# Patient Record
Sex: MALE | Race: WHITE | NOT HISPANIC OR LATINO | Employment: FULL TIME | URBAN - METROPOLITAN AREA
[De-identification: names, ages, dates, MRNs, and addresses within clinical notes are randomized per-mention and may not be internally consistent; named-entity substitution may affect disease eponyms.]

---

## 2023-11-08 ENCOUNTER — PHARMACY VISIT (OUTPATIENT)
Dept: PHARMACY | Facility: CLINIC | Age: 63
End: 2023-11-08
Payer: MEDICARE

## 2023-11-08 DIAGNOSIS — I48.0 AF (PAROXYSMAL ATRIAL FIBRILLATION) (MULTI): Primary | ICD-10-CM

## 2023-11-08 PROCEDURE — RXMED WILLOW AMBULATORY MEDICATION CHARGE

## 2023-11-08 RX ORDER — APIXABAN 5 MG/1
5 TABLET, FILM COATED ORAL 2 TIMES DAILY
COMMUNITY
End: 2023-11-08 | Stop reason: SDUPTHER

## 2023-11-08 RX ORDER — APIXABAN 5 MG/1
5 TABLET, FILM COATED ORAL 2 TIMES DAILY
Qty: 180 TABLET | Refills: 3 | Status: SHIPPED | OUTPATIENT
Start: 2023-11-08 | End: 2024-06-03

## 2023-11-09 ENCOUNTER — PHARMACY VISIT (OUTPATIENT)
Dept: PHARMACY | Facility: CLINIC | Age: 63
End: 2023-11-09

## 2023-11-09 PROCEDURE — RXOTC WILLOW AMBULATORY OTC CHARGE

## 2023-11-15 ASSESSMENT — DERMATOLOGY QUALITY OF LIFE (QOL) ASSESSMENT
RATE HOW BOTHERED YOU ARE BY EFFECTS OF YOUR SKIN PROBLEMS ON YOUR ACTIVITIES (EG, GOING OUT, ACCOMPLISHING WHAT YOU WANT, WORK ACTIVITIES OR YOUR RELATIONSHIPS WITH OTHERS): 0 - NEVER BOTHERED
RATE HOW EMOTIONALLY BOTHERED YOU ARE BY YOUR SKIN PROBLEM (FOR EXAMPLE, WORRY, EMBARRASSMENT, FRUSTRATION): 0 - NEVER BOTHERED
WHAT SINGLE SKIN CONDITION LISTED BELOW IS THE PATIENT ANSWERING THE QUALITY-OF-LIFE ASSESSMENT QUESTIONS ABOUT: NONE OF THE ABOVE
RATE HOW BOTHERED YOU ARE BY EFFECTS OF YOUR SKIN PROBLEMS ON YOUR ACTIVITIES (EG, GOING OUT, ACCOMPLISHING WHAT YOU WANT, WORK ACTIVITIES OR YOUR RELATIONSHIPS WITH OTHERS): 0 - NEVER BOTHERED
RATE HOW BOTHERED YOU ARE BY SYMPTOMS OF YOUR SKIN PROBLEM (EG, ITCHING, STINGING BURNING, HURTING OR SKIN IRRITATION): 0 - NEVER BOTHERED
DATE THE QUALITY-OF-LIFE ASSESSMENT WAS COMPLETED: 66793
RATE HOW EMOTIONALLY BOTHERED YOU ARE BY YOUR SKIN PROBLEM (FOR EXAMPLE, WORRY, EMBARRASSMENT, FRUSTRATION): 0 - NEVER BOTHERED
WHAT SINGLE SKIN CONDITION LISTED BELOW IS THE PATIENT ANSWERING THE QUALITY-OF-LIFE ASSESSMENT QUESTIONS ABOUT: NONE OF THE ABOVE
RATE HOW BOTHERED YOU ARE BY SYMPTOMS OF YOUR SKIN PROBLEM (EG, ITCHING, STINGING BURNING, HURTING OR SKIN IRRITATION): 0 - NEVER BOTHERED

## 2023-11-15 ASSESSMENT — PATIENT GLOBAL ASSESSMENT (PGA): WHAT IS THE PGA: PATIENT GLOBAL ASSESSMENT:  1 - CLEAR

## 2023-11-21 ENCOUNTER — OFFICE VISIT (OUTPATIENT)
Dept: DERMATOLOGY | Facility: CLINIC | Age: 63
End: 2023-11-21
Payer: OTHER GOVERNMENT

## 2023-11-21 DIAGNOSIS — B35.1 ONYCHOMYCOSIS: ICD-10-CM

## 2023-11-21 DIAGNOSIS — B35.3 TINEA PEDIS OF LEFT FOOT: ICD-10-CM

## 2023-11-21 DIAGNOSIS — L85.9: Primary | ICD-10-CM

## 2023-11-21 DIAGNOSIS — B35.3 TINEA PEDIS OF RIGHT FOOT: ICD-10-CM

## 2023-11-21 PROCEDURE — 99203 OFFICE O/P NEW LOW 30 MIN: CPT | Performed by: NURSE PRACTITIONER

## 2023-11-21 NOTE — PROGRESS NOTES
Subjective     Miguel Cano is a 63 y.o. male who presents for the following: Dermatitis (Cracked heels and toes.).     Review of Systems:  No other skin or systemic complaints other than what is documented elsewhere in the note.    The following portions of the chart were reviewed this encounter and updated as appropriate:          Skin Cancer History  No skin cancer on file.      Specialty Problems    None       Objective   Well appearing patient in no apparent distress; mood and affect are within normal limits.    A focused skin examination was performed. All findings within normal limits unless otherwise noted below.    Assessment/Plan   1. Hyperkeratosis of hindfoot (2)  Left Foot - Posterior, Right Foot - Posterior  Hyperkeratotic plaques L>R with fissures noted to the left heel.     Hyperkeratotis (PPK like) likely 2/2 to chronic tinea infection. Tinea infection on feet practically clear today given patient using lotrimin cream. Encouraged twice daily application of thick ointment (bag balm, vaseline, aquaphor) and cover with 100% cotton socks.     2. Onychomycosis (6)  Left 2nd Proximal Nail fold of Toe, Left 3rd Toe Nail Plate, Left Hallux Toe Nail Plate, Right 2nd Toe Nail Plate, Right 3rd Proximal Nail fold of Toe, Right Hallux Toe Nail Plate  Thickened yellow white nails with subungal debris    -We reviewed the etiology of onychomycosis.  It is due to a superficial dermatophyte infection of the nail plate.  This can cause thickening of the nails, as well as yellow discoloration and the toenail can become more fragile and prone to trauma.  -Treatment options discussed.  In order to penetrate the nail plate, oral medications must be used.  Typically terbinafine, an antifungal agent is the treatment of choice.  We discussed that the duration of therapy is at least 3 months, and can even be extended to 6 months based on clinical response.  We discussed that not all patients respond to therapy with  terbinafine.  In some patients who do respond, there is a high risk of recurrence.    -We discussed side effects of terbinafine in detail with the patient, particularly hepatotoxicity.  We discussed that blood work will need to be monitored during the course of therapy.  If fungal culture is positive will check a baseline CBC and LFTs at that time.  If within normal limits then can start terbinafine daily.  Repeat CBC and LFTs should be performed in 6 weeks to ensure no hepatotoxicity while on the medication. Other possible side effects include dysgeusia (disturbance of taste), QT prolongation (heart rate abnormalities), nausea, vomitting or dizzinessness.     Patient is not wanting to proceed with treatment with topical or oral    3. Tinea pedis of left foot  Left Foot - Posterior  Scaling and maceration between toes and over lateral edges of soles.    Continue with lotrimin cream twice daily as needed    4. Tinea pedis of right foot  Right Foot - Posterior  Mild scaly pink patches to around the toes and heels.     Continue with lotrimin cream twice daily as needed        Follow up as needed

## 2023-12-13 DIAGNOSIS — I10 ESSENTIAL (PRIMARY) HYPERTENSION: ICD-10-CM

## 2023-12-14 RX ORDER — LISINOPRIL 20 MG/1
20 TABLET ORAL 2 TIMES DAILY
Qty: 180 TABLET | Refills: 1 | OUTPATIENT
Start: 2023-12-14 | End: 2024-06-11

## 2023-12-26 ENCOUNTER — OFFICE VISIT (OUTPATIENT)
Dept: CARDIOLOGY | Facility: CLINIC | Age: 63
End: 2023-12-26
Payer: OTHER GOVERNMENT

## 2023-12-26 ENCOUNTER — LAB (OUTPATIENT)
Dept: LAB | Facility: LAB | Age: 63
End: 2023-12-26
Payer: OTHER GOVERNMENT

## 2023-12-26 VITALS — DIASTOLIC BLOOD PRESSURE: 98 MMHG | HEART RATE: 58 BPM | SYSTOLIC BLOOD PRESSURE: 138 MMHG

## 2023-12-26 DIAGNOSIS — R94.31 ABNORMAL EKG: ICD-10-CM

## 2023-12-26 DIAGNOSIS — R94.31 ABNORMAL EKG: Primary | ICD-10-CM

## 2023-12-26 DIAGNOSIS — I48.0 PAROXYSMAL ATRIAL FIBRILLATION (MULTI): ICD-10-CM

## 2023-12-26 LAB
ANION GAP SERPL CALC-SCNC: 10 MMOL/L
BUN SERPL-MCNC: 14 MG/DL (ref 8–25)
CALCIUM SERPL-MCNC: 8.9 MG/DL (ref 8.5–10.4)
CHLORIDE SERPL-SCNC: 101 MMOL/L (ref 97–107)
CO2 SERPL-SCNC: 28 MMOL/L (ref 24–31)
CREAT SERPL-MCNC: 0.9 MG/DL (ref 0.4–1.6)
ERYTHROCYTE [DISTWIDTH] IN BLOOD BY AUTOMATED COUNT: 12.8 % (ref 11.5–14.5)
GFR SERPL CREATININE-BSD FRML MDRD: >90 ML/MIN/1.73M*2
GLUCOSE SERPL-MCNC: 94 MG/DL (ref 65–99)
HCT VFR BLD AUTO: 46.9 % (ref 41–52)
HGB BLD-MCNC: 15.3 G/DL (ref 13.5–17.5)
MCH RBC QN AUTO: 29.1 PG (ref 26–34)
MCHC RBC AUTO-ENTMCNC: 32.6 G/DL (ref 32–36)
MCV RBC AUTO: 89 FL (ref 80–100)
NRBC BLD-RTO: 0 /100 WBCS (ref 0–0)
PLATELET # BLD AUTO: 224 X10*3/UL (ref 150–450)
POTASSIUM SERPL-SCNC: 4.2 MMOL/L (ref 3.4–5.1)
RBC # BLD AUTO: 5.26 X10*6/UL (ref 4.5–5.9)
SODIUM SERPL-SCNC: 139 MMOL/L (ref 133–145)
WBC # BLD AUTO: 7 X10*3/UL (ref 4.4–11.3)

## 2023-12-26 PROCEDURE — 1036F TOBACCO NON-USER: CPT | Performed by: INTERNAL MEDICINE

## 2023-12-26 PROCEDURE — 36415 COLL VENOUS BLD VENIPUNCTURE: CPT

## 2023-12-26 PROCEDURE — 85027 COMPLETE CBC AUTOMATED: CPT

## 2023-12-26 PROCEDURE — 99213 OFFICE O/P EST LOW 20 MIN: CPT | Performed by: INTERNAL MEDICINE

## 2023-12-26 PROCEDURE — 93010 ELECTROCARDIOGRAM REPORT: CPT | Performed by: INTERNAL MEDICINE

## 2023-12-26 PROCEDURE — 80048 BASIC METABOLIC PNL TOTAL CA: CPT

## 2023-12-26 PROCEDURE — 93005 ELECTROCARDIOGRAM TRACING: CPT | Performed by: INTERNAL MEDICINE

## 2023-12-26 RX ORDER — HYDROCHLOROTHIAZIDE 25 MG/1
25 TABLET ORAL DAILY
COMMUNITY
End: 2024-06-06

## 2023-12-26 RX ORDER — LISINOPRIL 5 MG/1
5 TABLET ORAL 2 TIMES DAILY
COMMUNITY
Start: 2021-09-01 | End: 2024-03-04 | Stop reason: SDUPTHER

## 2023-12-26 ASSESSMENT — PAIN SCALES - GENERAL: PAINLEVEL: 0-NO PAIN

## 2023-12-26 ASSESSMENT — ENCOUNTER SYMPTOMS
LOSS OF SENSATION IN FEET: 0
DEPRESSION: 0
OCCASIONAL FEELINGS OF UNSTEADINESS: 0

## 2023-12-26 ASSESSMENT — PATIENT HEALTH QUESTIONNAIRE - PHQ9
2. FEELING DOWN, DEPRESSED OR HOPELESS: NOT AT ALL
SUM OF ALL RESPONSES TO PHQ9 QUESTIONS 1 AND 2: 0
1. LITTLE INTEREST OR PLEASURE IN DOING THINGS: NOT AT ALL

## 2023-12-26 NOTE — PROGRESS NOTES
No chief complaint on file.           The patient is well-known to me from previous encounters.  He is a 63-year-old male with a history of atrial fibrillation post catheter-based therapy about 5 years ago.  He was previously on amiodarone but has done quite well with no need for antiarrhythmics.  He currently only takes antihypertensive medications and anticoagulants.  He still has had difficulty losing weight but tries to be physically active walking regularly.  He has no recurrent atrial fibrillation to his knowledge    Atrial Fibrillation  Past medical history includes atrial fibrillation.        Active Ambulatory Problems     Diagnosis Date Noted    No Active Ambulatory Problems     Resolved Ambulatory Problems     Diagnosis Date Noted    No Resolved Ambulatory Problems     Past Medical History:   Diagnosis Date    Personal history of other diseases of the circulatory system         Review of Systems   All other systems reviewed and are negative.       Objective     There were no vitals filed for this visit.     Vitals and nursing note reviewed.   Constitutional:       Appearance: Healthy appearance. Obese.   HENT:    Mouth/Throat:      Pharynx: Oropharynx is clear.   Pulmonary:      Effort: Pulmonary effort is normal.      Breath sounds: Normal breath sounds.   Cardiovascular:      PMI at left midclavicular line. Normal rate. Regular rhythm. Normal S1. Normal S2.       Murmurs: There is a grade 1/6 holosystolic murmur.      No gallop.  No click. No rub.   Pulses:     Intact distal pulses.   Edema:     Peripheral edema absent.   Abdominal:      General: Bowel sounds are normal.   Musculoskeletal:      Cervical back: Normal range of motion. Skin:     General: Skin is warm and dry.   Neurological:      General: No focal deficit present.      Mental Status: Alert and oriented to person, place and time.            Lab Review:   No visits with results within 2 Month(s) from this visit.   Latest known visit with  results is:   [facility] Encounter on 10/24/2022   Component Date Value    Calcium 10/24/2022 9.0     AST 10/24/2022 20     Alkaline Phosphatase 10/24/2022 99     Total Bilirubin 10/24/2022 0.7     Total Protein 10/24/2022 7.1     Albumin 10/24/2022 4.1     Globulin, Total 10/24/2022 3.0     A/G Ratio 10/24/2022 1.4 (L)     Sodium 10/24/2022 140     Potassium 10/24/2022 4.1     Chloride 10/24/2022 102     Bicarbonate 10/24/2022 22 (L)     Anion Gap 10/24/2022 16     Urea Nitrogen 10/24/2022 16     Creatinine 10/24/2022 1.0     Urea Nitrogen/Creatinine* 10/24/2022 16.0     Glucose 10/24/2022 102 (H)     ALT (SGPT) 10/24/2022 20     ESTIMATED GFR 10/24/2022 85     Differential Type 10/24/2022 AUTO DIFF     Immature Granulocyte %, * 10/24/2022 0.30     Neutrophil 10/24/2022 55.00     Lymphocytes % 10/24/2022 32.70     Monocytes % 10/24/2022 9.70 (H)     Eosinophil 10/24/2022 1.80     Basophils % 10/24/2022 0.50     Immature Granulocytes Ab* 10/24/2022 0.02     Neutrophils Absolute 10/24/2022 3.39     Lymphocytes Absolute 10/24/2022 2.02     Monocytes Absolute 10/24/2022 0.60     Eosinophils Absolute 10/24/2022 0.11     Basophils Absolute 10/24/2022 0.03     WBC 10/24/2022 6.2     RBC 10/24/2022 5.25     Hemoglobin 10/24/2022 15.4     Hematocrit 10/24/2022 47.2     MCV 10/24/2022 89.9     MCH 10/24/2022 29.3     MCHC 10/24/2022 32.6     RDW-SD 10/24/2022 41.8     RDW-CV 10/24/2022 12.7     Platelets 10/24/2022 222     MPV 10/24/2022 10.8     nRBC 10/24/2022 0     ABSOLUTE NEUTROPHIL CALC* 10/24/2022 3.39     Hemoglobin A1C 10/24/2022 5.7     SERUM COLOR 10/24/2022 YELLOW     SERUM CLARITY 10/24/2022 CLEAR     Is the patient fasting? 10/24/2022 FASTING     Cholesterol 10/24/2022 143     Triglycerides 10/24/2022 80     HDL 10/24/2022 43     LDL Calculated 10/24/2022 84     Cholesterol/HDL Ratio 10/24/2022 3.3     PSA 10/24/2022 0.8     Thyroid Stimulating Horm* 10/24/2022 3.32        ECG:    Sinus bradycardia 54 bpm OK  interval 174 ms QRS duration 106 ms QTc 445 ms    Assessment/plan:  Continue current regimen.  Problem List Items Addressed This Visit    None

## 2024-01-31 PROCEDURE — RXMED WILLOW AMBULATORY MEDICATION CHARGE

## 2024-02-02 ENCOUNTER — PHARMACY VISIT (OUTPATIENT)
Dept: PHARMACY | Facility: CLINIC | Age: 64
End: 2024-02-02
Payer: COMMERCIAL

## 2024-02-22 RX ORDER — LISINOPRIL 20 MG/1
20 TABLET ORAL 2 TIMES DAILY
Qty: 60 TABLET | Refills: 5 | OUTPATIENT
Start: 2024-02-22 | End: 2024-08-20

## 2024-02-28 ASSESSMENT — ENCOUNTER SYMPTOMS
HYPERTENSION: 1
SHORTNESS OF BREATH: 0
SWEATS: 0
ORTHOPNEA: 0
HEADACHES: 0
NECK PAIN: 0
BLURRED VISION: 0
PALPITATIONS: 0
PND: 0

## 2024-03-04 ENCOUNTER — OFFICE VISIT (OUTPATIENT)
Dept: PRIMARY CARE | Facility: CLINIC | Age: 64
End: 2024-03-04
Payer: OTHER GOVERNMENT

## 2024-03-04 VITALS
OXYGEN SATURATION: 97 % | RESPIRATION RATE: 20 BRPM | BODY MASS INDEX: 38.57 KG/M2 | TEMPERATURE: 97.8 F | HEART RATE: 63 BPM | WEIGHT: 291 LBS | HEIGHT: 73 IN

## 2024-03-04 DIAGNOSIS — Z12.5 PROSTATE CANCER SCREENING: ICD-10-CM

## 2024-03-04 DIAGNOSIS — R53.83 OTHER FATIGUE: ICD-10-CM

## 2024-03-04 DIAGNOSIS — R73.9 HYPERGLYCEMIA: ICD-10-CM

## 2024-03-04 DIAGNOSIS — Z00.00 ANNUAL PHYSICAL EXAM: Primary | ICD-10-CM

## 2024-03-04 DIAGNOSIS — I10 BENIGN ESSENTIAL HYPERTENSION: ICD-10-CM

## 2024-03-04 DIAGNOSIS — E78.00 PURE HYPERCHOLESTEROLEMIA: ICD-10-CM

## 2024-03-04 PROBLEM — I47.10 SVT (SUPRAVENTRICULAR TACHYCARDIA) (CMS-HCC): Status: ACTIVE | Noted: 2024-03-04

## 2024-03-04 PROBLEM — I11.9 HYPERTENSIVE HEART DISEASE WITHOUT HEART FAILURE: Status: ACTIVE | Noted: 2024-03-04

## 2024-03-04 PROBLEM — I25.10 ATHEROSCLEROTIC HEART DISEASE OF NATIVE CORONARY ARTERY WITHOUT ANGINA PECTORIS: Status: ACTIVE | Noted: 2024-03-04

## 2024-03-04 PROBLEM — J34.3 HYPERTROPHY OF BOTH INFERIOR NASAL TURBINATES: Status: ACTIVE | Noted: 2024-03-04

## 2024-03-04 PROBLEM — I45.9 CONDUCTION DISORDER OF THE HEART: Status: ACTIVE | Noted: 2024-03-04

## 2024-03-04 PROBLEM — R00.2 PALPITATIONS: Status: ACTIVE | Noted: 2024-03-04

## 2024-03-04 PROBLEM — M25.662 STIFFNESS OF LEFT KNEE: Status: RESOLVED | Noted: 2022-03-01 | Resolved: 2024-03-04

## 2024-03-04 PROBLEM — M17.12 PRIMARY OSTEOARTHRITIS OF LEFT KNEE: Status: ACTIVE | Noted: 2022-02-06

## 2024-03-04 PROBLEM — G47.33 OBSTRUCTIVE SLEEP APNEA SYNDROME: Status: ACTIVE | Noted: 2024-03-04

## 2024-03-04 PROBLEM — I42.0 DILATED CARDIOMYOPATHY (MULTI): Status: ACTIVE | Noted: 2024-03-04

## 2024-03-04 PROBLEM — Z96.659 HISTORY OF TOTAL KNEE REPLACEMENT: Status: ACTIVE | Noted: 2018-06-18

## 2024-03-04 PROBLEM — R06.02 SHORTNESS OF BREATH: Status: RESOLVED | Noted: 2024-03-04 | Resolved: 2024-03-04

## 2024-03-04 LAB
ALBUMIN SERPL-MCNC: 4.2 G/DL (ref 3.5–5)
ALP BLD-CCNC: 96 U/L (ref 35–125)
ALT SERPL-CCNC: 20 U/L (ref 5–40)
ANION GAP SERPL CALC-SCNC: 14 MMOL/L
AST SERPL-CCNC: 20 U/L (ref 5–40)
BASOPHILS # BLD AUTO: 0.03 X10*3/UL (ref 0–0.1)
BASOPHILS NFR BLD AUTO: 0.5 %
BILIRUB SERPL-MCNC: 0.9 MG/DL (ref 0.1–1.2)
BUN SERPL-MCNC: 13 MG/DL (ref 8–25)
CALCIUM SERPL-MCNC: 9.2 MG/DL (ref 8.5–10.4)
CHLORIDE SERPL-SCNC: 99 MMOL/L (ref 97–107)
CHOLEST SERPL-MCNC: 133 MG/DL (ref 133–200)
CHOLEST/HDLC SERPL: 2.9 {RATIO}
CO2 SERPL-SCNC: 23 MMOL/L (ref 24–31)
CREAT SERPL-MCNC: 0.8 MG/DL (ref 0.4–1.6)
EGFRCR SERPLBLD CKD-EPI 2021: >90 ML/MIN/1.73M*2
EOSINOPHIL # BLD AUTO: 0.08 X10*3/UL (ref 0–0.7)
EOSINOPHIL NFR BLD AUTO: 1.3 %
ERYTHROCYTE [DISTWIDTH] IN BLOOD BY AUTOMATED COUNT: 12.8 % (ref 11.5–14.5)
GLUCOSE SERPL-MCNC: 107 MG/DL (ref 65–99)
HCT VFR BLD AUTO: 46.8 % (ref 41–52)
HDLC SERPL-MCNC: 46 MG/DL
HGB BLD-MCNC: 15.6 G/DL (ref 13.5–17.5)
IMM GRANULOCYTES # BLD AUTO: 0.02 X10*3/UL (ref 0–0.7)
IMM GRANULOCYTES NFR BLD AUTO: 0.3 % (ref 0–0.9)
LDLC SERPL CALC-MCNC: 69 MG/DL (ref 65–130)
LYMPHOCYTES # BLD AUTO: 2.02 X10*3/UL (ref 1.2–4.8)
LYMPHOCYTES NFR BLD AUTO: 33.4 %
MCH RBC QN AUTO: 29.3 PG (ref 26–34)
MCHC RBC AUTO-ENTMCNC: 33.3 G/DL (ref 32–36)
MCV RBC AUTO: 88 FL (ref 80–100)
MONOCYTES # BLD AUTO: 0.47 X10*3/UL (ref 0.1–1)
MONOCYTES NFR BLD AUTO: 7.8 %
NEUTROPHILS # BLD AUTO: 3.43 X10*3/UL (ref 1.2–7.7)
NEUTROPHILS NFR BLD AUTO: 56.7 %
NRBC BLD-RTO: 0 /100 WBCS (ref 0–0)
PLATELET # BLD AUTO: 232 X10*3/UL (ref 150–450)
POTASSIUM SERPL-SCNC: 4.1 MMOL/L (ref 3.4–5.1)
PROT SERPL-MCNC: 6.8 G/DL (ref 5.9–7.9)
PSA SERPL-MCNC: 1.1 NG/ML
RBC # BLD AUTO: 5.32 X10*6/UL (ref 4.5–5.9)
SODIUM SERPL-SCNC: 136 MMOL/L (ref 133–145)
TRIGL SERPL-MCNC: 89 MG/DL (ref 40–150)
WBC # BLD AUTO: 6.1 X10*3/UL (ref 4.4–11.3)

## 2024-03-04 PROCEDURE — 99396 PREV VISIT EST AGE 40-64: CPT | Performed by: FAMILY MEDICINE

## 2024-03-04 PROCEDURE — 85025 COMPLETE CBC W/AUTO DIFF WBC: CPT | Performed by: FAMILY MEDICINE

## 2024-03-04 PROCEDURE — 80053 COMPREHEN METABOLIC PANEL: CPT | Performed by: FAMILY MEDICINE

## 2024-03-04 PROCEDURE — 1036F TOBACCO NON-USER: CPT | Performed by: FAMILY MEDICINE

## 2024-03-04 PROCEDURE — 80061 LIPID PANEL: CPT | Performed by: FAMILY MEDICINE

## 2024-03-04 PROCEDURE — 36415 COLL VENOUS BLD VENIPUNCTURE: CPT | Performed by: FAMILY MEDICINE

## 2024-03-04 PROCEDURE — 83036 HEMOGLOBIN GLYCOSYLATED A1C: CPT | Performed by: FAMILY MEDICINE

## 2024-03-04 PROCEDURE — 84153 ASSAY OF PSA TOTAL: CPT | Performed by: FAMILY MEDICINE

## 2024-03-04 RX ORDER — LISINOPRIL 5 MG/1
5 TABLET ORAL 2 TIMES DAILY
Qty: 90 TABLET | Refills: 3 | Status: SHIPPED | OUTPATIENT
Start: 2024-03-04 | End: 2024-06-03

## 2024-03-04 ASSESSMENT — ENCOUNTER SYMPTOMS
NECK PAIN: 0
HEADACHES: 0
SWEATS: 0
PALPITATIONS: 0
ORTHOPNEA: 0
BLURRED VISION: 0
SHORTNESS OF BREATH: 0
PND: 0
HYPERTENSION: 1

## 2024-03-04 ASSESSMENT — PATIENT HEALTH QUESTIONNAIRE - PHQ9
1. LITTLE INTEREST OR PLEASURE IN DOING THINGS: NOT AT ALL
SUM OF ALL RESPONSES TO PHQ9 QUESTIONS 1 AND 2: 0
2. FEELING DOWN, DEPRESSED OR HOPELESS: NOT AT ALL

## 2024-03-04 ASSESSMENT — PAIN SCALES - GENERAL: PAINLEVEL: 0-NO PAIN

## 2024-03-04 NOTE — PROGRESS NOTES
"Subjective   Patient ID: Miguel Cano is a 63 y.o. male who presents for medication refills (Pt here for medication refills).    Hypertension  This is a chronic problem. The current episode started more than 1 year ago. The problem is unchanged. The problem is controlled. Pertinent negatives include no anxiety, blurred vision, chest pain, headaches, malaise/fatigue, neck pain, orthopnea, palpitations, peripheral edema, PND, shortness of breath or sweats. There are no associated agents to hypertension. Risk factors for coronary artery disease include obesity. Compliance problems include diet.         Review of Systems   Constitutional:  Negative for malaise/fatigue.   Eyes:  Negative for blurred vision.   Respiratory:  Negative for shortness of breath.    Cardiovascular:  Negative for chest pain, palpitations, orthopnea and PND.   Musculoskeletal:  Negative for neck pain.   Neurological:  Negative for headaches.       Objective   Pulse 63   Temp 36.6 °C (97.8 °F) (Temporal)   Resp 20   Ht 1.854 m (6' 1\")   Wt 132 kg (291 lb)   SpO2 97%   BMI 38.39 kg/m²     Physical Exam  Vitals and nursing note reviewed.   Constitutional:       General: He is not in acute distress.  HENT:      Right Ear: Tympanic membrane and ear canal normal.      Left Ear: Tympanic membrane and ear canal normal.      Nose: Nose normal. No rhinorrhea.      Mouth/Throat:      Pharynx: Oropharynx is clear. No oropharyngeal exudate or posterior oropharyngeal erythema.      Comments: Dentition wnl  Eyes:      Extraocular Movements: Extraocular movements intact.      Conjunctiva/sclera: Conjunctivae normal.      Pupils: Pupils are equal, round, and reactive to light.   Neck:      Vascular: No carotid bruit.   Cardiovascular:      Rate and Rhythm: Normal rate and regular rhythm.      Heart sounds: Normal heart sounds. No murmur heard.  Pulmonary:      Breath sounds: Normal breath sounds. No wheezing or rhonchi.   Abdominal:      General: " Bowel sounds are normal. There is no distension.      Palpations: Abdomen is soft. There is no mass.      Tenderness: There is no abdominal tenderness. There is no guarding or rebound.      Hernia: No hernia is present.   Musculoskeletal:         General: No swelling or tenderness. Normal range of motion.      Cervical back: Normal range of motion and neck supple.   Lymphadenopathy:      Cervical: No cervical adenopathy.   Skin:     General: Skin is warm.      Findings: No rash.   Neurological:      General: No focal deficit present.      Mental Status: He is alert.         Assessment/Plan   Problem List Items Addressed This Visit             ICD-10-CM    Annual physical exam - Primary Z00.00

## 2024-03-05 DIAGNOSIS — R73.9 HYPERGLYCEMIA: ICD-10-CM

## 2024-03-05 LAB
EST. AVERAGE GLUCOSE BLD GHB EST-MCNC: 123 MG/DL
HBA1C MFR BLD: 5.9 %

## 2024-03-06 DIAGNOSIS — I10 BENIGN ESSENTIAL HYPERTENSION: ICD-10-CM

## 2024-03-06 RX ORDER — LISINOPRIL 20 MG/1
20 TABLET ORAL DAILY
COMMUNITY
End: 2024-04-04 | Stop reason: ENTERED-IN-ERROR

## 2024-03-07 RX ORDER — LISINOPRIL 20 MG/1
20 TABLET ORAL 2 TIMES DAILY
Qty: 180 TABLET | Refills: 1 | OUTPATIENT
Start: 2024-03-07

## 2024-03-08 RX ORDER — LISINOPRIL 20 MG/1
20 TABLET ORAL 2 TIMES DAILY
Qty: 180 TABLET | Refills: 3 | OUTPATIENT
Start: 2024-03-08 | End: 2025-03-03

## 2024-04-04 ENCOUNTER — HOSPITAL ENCOUNTER (OUTPATIENT)
Facility: HOSPITAL | Age: 64
Setting detail: OBSERVATION
Discharge: HOME | End: 2024-04-05
Attending: EMERGENCY MEDICINE | Admitting: INTERNAL MEDICINE
Payer: OTHER GOVERNMENT

## 2024-04-04 ENCOUNTER — APPOINTMENT (OUTPATIENT)
Dept: CARDIOLOGY | Facility: HOSPITAL | Age: 64
End: 2024-04-04
Payer: OTHER GOVERNMENT

## 2024-04-04 DIAGNOSIS — R79.89 ELEVATED TROPONIN: ICD-10-CM

## 2024-04-04 DIAGNOSIS — I48.3 TYPICAL ATRIAL FLUTTER (MULTI): Primary | ICD-10-CM

## 2024-04-04 LAB
ALBUMIN SERPL-MCNC: 4 G/DL (ref 3.5–5)
ALP BLD-CCNC: 98 U/L (ref 35–125)
ALT SERPL-CCNC: 23 U/L (ref 5–40)
ANION GAP SERPL CALC-SCNC: 12 MMOL/L
AST SERPL-CCNC: 23 U/L (ref 5–40)
BASOPHILS # BLD AUTO: 0.03 X10*3/UL (ref 0–0.1)
BASOPHILS NFR BLD AUTO: 0.4 %
BILIRUB SERPL-MCNC: 1 MG/DL (ref 0.1–1.2)
BUN SERPL-MCNC: 14 MG/DL (ref 8–25)
CALCIUM SERPL-MCNC: 8.7 MG/DL (ref 8.5–10.4)
CHLORIDE SERPL-SCNC: 99 MMOL/L (ref 97–107)
CO2 SERPL-SCNC: 25 MMOL/L (ref 24–31)
CREAT SERPL-MCNC: 0.9 MG/DL (ref 0.4–1.6)
EGFRCR SERPLBLD CKD-EPI 2021: >90 ML/MIN/1.73M*2
EOSINOPHIL # BLD AUTO: 0.11 X10*3/UL (ref 0–0.7)
EOSINOPHIL NFR BLD AUTO: 1.5 %
ERYTHROCYTE [DISTWIDTH] IN BLOOD BY AUTOMATED COUNT: 13.3 % (ref 11.5–14.5)
GLUCOSE SERPL-MCNC: 109 MG/DL (ref 65–99)
HCT VFR BLD AUTO: 48.5 % (ref 41–52)
HGB BLD-MCNC: 15.9 G/DL (ref 13.5–17.5)
IMM GRANULOCYTES # BLD AUTO: 0.03 X10*3/UL (ref 0–0.7)
IMM GRANULOCYTES NFR BLD AUTO: 0.4 % (ref 0–0.9)
LYMPHOCYTES # BLD AUTO: 2.43 X10*3/UL (ref 1.2–4.8)
LYMPHOCYTES NFR BLD AUTO: 32.9 %
MAGNESIUM SERPL-MCNC: 1.9 MG/DL (ref 1.6–3.1)
MCH RBC QN AUTO: 29.1 PG (ref 26–34)
MCHC RBC AUTO-ENTMCNC: 32.8 G/DL (ref 32–36)
MCV RBC AUTO: 89 FL (ref 80–100)
MONOCYTES # BLD AUTO: 0.63 X10*3/UL (ref 0.1–1)
MONOCYTES NFR BLD AUTO: 8.5 %
NEUTROPHILS # BLD AUTO: 4.15 X10*3/UL (ref 1.2–7.7)
NEUTROPHILS NFR BLD AUTO: 56.3 %
NRBC BLD-RTO: 0 /100 WBCS (ref 0–0)
NT-PROBNP SERPL-MCNC: 1557 PG/ML (ref 0–177)
PLATELET # BLD AUTO: 245 X10*3/UL (ref 150–450)
POTASSIUM SERPL-SCNC: 3.8 MMOL/L (ref 3.4–5.1)
PROT SERPL-MCNC: 6.9 G/DL (ref 5.9–7.9)
RBC # BLD AUTO: 5.47 X10*6/UL (ref 4.5–5.9)
SODIUM SERPL-SCNC: 136 MMOL/L (ref 133–145)
TROPONIN T SERPL-MCNC: 75 NG/L
TROPONIN T SERPL-MCNC: 75 NG/L
TROPONIN T SERPL-MCNC: 86 NG/L
TSH SERPL DL<=0.05 MIU/L-ACNC: 1.42 MIU/L (ref 0.27–4.2)
WBC # BLD AUTO: 7.4 X10*3/UL (ref 4.4–11.3)

## 2024-04-04 PROCEDURE — 84484 ASSAY OF TROPONIN QUANT: CPT | Performed by: EMERGENCY MEDICINE

## 2024-04-04 PROCEDURE — 83735 ASSAY OF MAGNESIUM: CPT | Performed by: EMERGENCY MEDICINE

## 2024-04-04 PROCEDURE — 84443 ASSAY THYROID STIM HORMONE: CPT | Performed by: REGISTERED NURSE

## 2024-04-04 PROCEDURE — G0378 HOSPITAL OBSERVATION PER HR: HCPCS

## 2024-04-04 PROCEDURE — 93005 ELECTROCARDIOGRAM TRACING: CPT

## 2024-04-04 PROCEDURE — 96374 THER/PROPH/DIAG INJ IV PUSH: CPT

## 2024-04-04 PROCEDURE — 2500000001 HC RX 250 WO HCPCS SELF ADMINISTERED DRUGS (ALT 637 FOR MEDICARE OP): Performed by: NURSE PRACTITIONER

## 2024-04-04 PROCEDURE — 83880 ASSAY OF NATRIURETIC PEPTIDE: CPT | Performed by: REGISTERED NURSE

## 2024-04-04 PROCEDURE — 80053 COMPREHEN METABOLIC PANEL: CPT | Performed by: EMERGENCY MEDICINE

## 2024-04-04 PROCEDURE — 36415 COLL VENOUS BLD VENIPUNCTURE: CPT | Performed by: EMERGENCY MEDICINE

## 2024-04-04 PROCEDURE — 96376 TX/PRO/DX INJ SAME DRUG ADON: CPT

## 2024-04-04 PROCEDURE — 99223 1ST HOSP IP/OBS HIGH 75: CPT | Performed by: NURSE PRACTITIONER

## 2024-04-04 PROCEDURE — 2500000005 HC RX 250 GENERAL PHARMACY W/O HCPCS: Performed by: EMERGENCY MEDICINE

## 2024-04-04 PROCEDURE — 85025 COMPLETE CBC W/AUTO DIFF WBC: CPT | Performed by: EMERGENCY MEDICINE

## 2024-04-04 PROCEDURE — 2500000001 HC RX 250 WO HCPCS SELF ADMINISTERED DRUGS (ALT 637 FOR MEDICARE OP): Performed by: REGISTERED NURSE

## 2024-04-04 PROCEDURE — 99285 EMERGENCY DEPT VISIT HI MDM: CPT | Mod: 25

## 2024-04-04 RX ORDER — ACETAMINOPHEN 325 MG/1
650 TABLET ORAL EVERY 4 HOURS PRN
Status: DISCONTINUED | OUTPATIENT
Start: 2024-04-04 | End: 2024-04-05 | Stop reason: HOSPADM

## 2024-04-04 RX ORDER — LORAZEPAM 2 MG/ML
INJECTION INTRAMUSCULAR
Status: DISPENSED
Start: 2024-04-04 | End: 2024-04-04

## 2024-04-04 RX ORDER — ACETAMINOPHEN 160 MG/5ML
650 SOLUTION ORAL EVERY 4 HOURS PRN
Status: DISCONTINUED | OUTPATIENT
Start: 2024-04-04 | End: 2024-04-05 | Stop reason: HOSPADM

## 2024-04-04 RX ORDER — POLYETHYLENE GLYCOL 3350 17 G/17G
17 POWDER, FOR SOLUTION ORAL DAILY
Status: DISCONTINUED | OUTPATIENT
Start: 2024-04-04 | End: 2024-04-05 | Stop reason: HOSPADM

## 2024-04-04 RX ORDER — LISINOPRIL 5 MG/1
5 TABLET ORAL 2 TIMES DAILY
Status: DISCONTINUED | OUTPATIENT
Start: 2024-04-04 | End: 2024-04-05 | Stop reason: HOSPADM

## 2024-04-04 RX ORDER — METOPROLOL TARTRATE 1 MG/ML
5 INJECTION, SOLUTION INTRAVENOUS EVERY 6 HOURS PRN
Status: DISCONTINUED | OUTPATIENT
Start: 2024-04-04 | End: 2024-04-05 | Stop reason: HOSPADM

## 2024-04-04 RX ORDER — ONDANSETRON HYDROCHLORIDE 2 MG/ML
4 INJECTION, SOLUTION INTRAVENOUS EVERY 8 HOURS PRN
Status: DISCONTINUED | OUTPATIENT
Start: 2024-04-04 | End: 2024-04-05 | Stop reason: HOSPADM

## 2024-04-04 RX ORDER — METOPROLOL TARTRATE 1 MG/ML
5 INJECTION, SOLUTION INTRAVENOUS EVERY 6 HOURS
Status: DISCONTINUED | OUTPATIENT
Start: 2024-04-04 | End: 2024-04-04

## 2024-04-04 RX ORDER — ACETAMINOPHEN 650 MG/1
650 SUPPOSITORY RECTAL EVERY 4 HOURS PRN
Status: DISCONTINUED | OUTPATIENT
Start: 2024-04-04 | End: 2024-04-05 | Stop reason: HOSPADM

## 2024-04-04 RX ORDER — METOPROLOL SUCCINATE 25 MG/1
25 TABLET, EXTENDED RELEASE ORAL 2 TIMES DAILY
Status: DISCONTINUED | OUTPATIENT
Start: 2024-04-04 | End: 2024-04-05 | Stop reason: HOSPADM

## 2024-04-04 RX ORDER — ONDANSETRON 4 MG/1
4 TABLET, FILM COATED ORAL EVERY 8 HOURS PRN
Status: DISCONTINUED | OUTPATIENT
Start: 2024-04-04 | End: 2024-04-05 | Stop reason: HOSPADM

## 2024-04-04 RX ORDER — PANTOPRAZOLE SODIUM 40 MG/1
40 TABLET, DELAYED RELEASE ORAL
Status: DISCONTINUED | OUTPATIENT
Start: 2024-04-05 | End: 2024-04-05 | Stop reason: HOSPADM

## 2024-04-04 RX ORDER — HYDROCHLOROTHIAZIDE 25 MG/1
25 TABLET ORAL DAILY
Status: DISCONTINUED | OUTPATIENT
Start: 2024-04-05 | End: 2024-04-05 | Stop reason: HOSPADM

## 2024-04-04 RX ORDER — METOPROLOL TARTRATE 1 MG/ML
5 INJECTION, SOLUTION INTRAVENOUS EVERY 5 MIN PRN
Status: DISCONTINUED | OUTPATIENT
Start: 2024-04-04 | End: 2024-04-04

## 2024-04-04 RX ADMIN — METOPROLOL TARTRATE 5 MG: 5 INJECTION INTRAVENOUS at 09:23

## 2024-04-04 RX ADMIN — APIXABAN 5 MG: 5 TABLET, FILM COATED ORAL at 20:05

## 2024-04-04 RX ADMIN — METOPROLOL TARTRATE 5 MG: 5 INJECTION INTRAVENOUS at 12:09

## 2024-04-04 RX ADMIN — LISINOPRIL 5 MG: 5 TABLET ORAL at 20:05

## 2024-04-04 RX ADMIN — METOPROLOL SUCCINATE 25 MG: 25 TABLET, FILM COATED, EXTENDED RELEASE ORAL at 20:05

## 2024-04-04 SDOH — SOCIAL STABILITY: SOCIAL INSECURITY: WERE YOU ABLE TO COMPLETE ALL THE BEHAVIORAL HEALTH SCREENINGS?: YES

## 2024-04-04 SDOH — SOCIAL STABILITY: SOCIAL INSECURITY: DO YOU FEEL UNSAFE GOING BACK TO THE PLACE WHERE YOU ARE LIVING?: NO

## 2024-04-04 SDOH — SOCIAL STABILITY: SOCIAL INSECURITY: DO YOU FEEL ANYONE HAS EXPLOITED OR TAKEN ADVANTAGE OF YOU FINANCIALLY OR OF YOUR PERSONAL PROPERTY?: NO

## 2024-04-04 SDOH — SOCIAL STABILITY: SOCIAL INSECURITY: ABUSE: ADULT

## 2024-04-04 SDOH — SOCIAL STABILITY: SOCIAL INSECURITY: ARE YOU OR HAVE YOU BEEN THREATENED OR ABUSED PHYSICALLY, EMOTIONALLY, OR SEXUALLY BY ANYONE?: NO

## 2024-04-04 SDOH — SOCIAL STABILITY: SOCIAL INSECURITY: HAS ANYONE EVER THREATENED TO HURT YOUR FAMILY OR YOUR PETS?: NO

## 2024-04-04 SDOH — SOCIAL STABILITY: SOCIAL INSECURITY: ARE THERE ANY APPARENT SIGNS OF INJURIES/BEHAVIORS THAT COULD BE RELATED TO ABUSE/NEGLECT?: NO

## 2024-04-04 SDOH — SOCIAL STABILITY: SOCIAL INSECURITY: HAVE YOU HAD THOUGHTS OF HARMING ANYONE ELSE?: NO

## 2024-04-04 SDOH — SOCIAL STABILITY: SOCIAL INSECURITY: DOES ANYONE TRY TO KEEP YOU FROM HAVING/CONTACTING OTHER FRIENDS OR DOING THINGS OUTSIDE YOUR HOME?: NO

## 2024-04-04 ASSESSMENT — COGNITIVE AND FUNCTIONAL STATUS - GENERAL
MOBILITY SCORE: 24
DAILY ACTIVITIY SCORE: 24
PATIENT BASELINE BEDBOUND: NO

## 2024-04-04 ASSESSMENT — ACTIVITIES OF DAILY LIVING (ADL)
BATHING: INDEPENDENT
GROOMING: INDEPENDENT
TOILETING: INDEPENDENT
ADEQUATE_TO_COMPLETE_ADL: YES
HEARING - RIGHT EAR: FUNCTIONAL
HEARING - LEFT EAR: FUNCTIONAL
PATIENT'S MEMORY ADEQUATE TO SAFELY COMPLETE DAILY ACTIVITIES?: YES
WALKS IN HOME: INDEPENDENT
JUDGMENT_ADEQUATE_SAFELY_COMPLETE_DAILY_ACTIVITIES: YES
LACK_OF_TRANSPORTATION: NO
FEEDING YOURSELF: INDEPENDENT
DRESSING YOURSELF: INDEPENDENT

## 2024-04-04 ASSESSMENT — ENCOUNTER SYMPTOMS
EYES NEGATIVE: 1
CARDIOVASCULAR NEGATIVE: 1
CHOKING: 0
HEADACHES: 1
PSYCHIATRIC NEGATIVE: 1
APNEA: 0
MUSCULOSKELETAL NEGATIVE: 1
CHEST TIGHTNESS: 0
STRIDOR: 0
WHEEZING: 0
ALLERGIC/IMMUNOLOGIC NEGATIVE: 1
HEMATOLOGIC/LYMPHATIC NEGATIVE: 1
COUGH: 0
ENDOCRINE NEGATIVE: 1
SHORTNESS OF BREATH: 1
GASTROINTESTINAL NEGATIVE: 1
CONSTITUTIONAL NEGATIVE: 1
LIGHT-HEADEDNESS: 1

## 2024-04-04 ASSESSMENT — LIFESTYLE VARIABLES
AUDIT-C TOTAL SCORE: 5
SKIP TO QUESTIONS 9-10: 0
HOW OFTEN DURING THE LAST YEAR HAVE YOU FOUND THAT YOU WERE NOT ABLE TO STOP DRINKING ONCE YOU HAD STARTED: NEVER
HOW OFTEN DURING THE LAST YEAR HAVE YOU FAILED TO DO WHAT WAS NORMALLY EXPECTED FROM YOU BECAUSE OF DRINKING: NEVER
EVER FELT BAD OR GUILTY ABOUT YOUR DRINKING: NO
AUDIT TOTAL SCORE: 0
HOW OFTEN DURING THE LAST YEAR HAVE YOU BEEN UNABLE TO REMEMBER WHAT HAPPENED THE NIGHT BEFORE BECAUSE YOU HAD BEEN DRINKING: NEVER
HOW OFTEN DO YOU HAVE 6 OR MORE DRINKS ON ONE OCCASION: LESS THAN MONTHLY
AUDIT TOTAL SCORE: 5
TOTAL SCORE: 0
HAVE PEOPLE ANNOYED YOU BY CRITICIZING YOUR DRINKING: NO
HOW OFTEN DO YOU HAVE A DRINK CONTAINING ALCOHOL: 2-4 TIMES A MONTH
HOW OFTEN DURING THE LAST YEAR HAVE YOU HAD A FEELING OF GUILT OR REMORSE AFTER DRINKING: NEVER
EVER HAD A DRINK FIRST THING IN THE MORNING TO STEADY YOUR NERVES TO GET RID OF A HANGOVER: NO
HOW OFTEN DURING THE LAST YEAR HAVE YOU NEEDED AN ALCOHOLIC DRINK FIRST THING IN THE MORNING TO GET YOURSELF GOING AFTER A NIGHT OF HEAVY DRINKING: NEVER
HAVE YOU OR SOMEONE ELSE BEEN INJURED AS A RESULT OF YOUR DRINKING: NO
HOW MANY STANDARD DRINKS CONTAINING ALCOHOL DO YOU HAVE ON A TYPICAL DAY: 5 OR 6
HAVE YOU EVER FELT YOU SHOULD CUT DOWN ON YOUR DRINKING: NO
HAS A RELATIVE, FRIEND, DOCTOR, OR ANOTHER HEALTH PROFESSIONAL EXPRESSED CONCERN ABOUT YOUR DRINKING OR SUGGESTED YOU CUT DOWN: NO
AUDIT-C TOTAL SCORE: 5

## 2024-04-04 ASSESSMENT — COLUMBIA-SUICIDE SEVERITY RATING SCALE - C-SSRS
1. IN THE PAST MONTH, HAVE YOU WISHED YOU WERE DEAD OR WISHED YOU COULD GO TO SLEEP AND NOT WAKE UP?: NO
2. HAVE YOU ACTUALLY HAD ANY THOUGHTS OF KILLING YOURSELF?: NO
6. HAVE YOU EVER DONE ANYTHING, STARTED TO DO ANYTHING, OR PREPARED TO DO ANYTHING TO END YOUR LIFE?: NO

## 2024-04-04 ASSESSMENT — PAIN SCALES - GENERAL
PAINLEVEL_OUTOF10: 0 - NO PAIN

## 2024-04-04 ASSESSMENT — PATIENT HEALTH QUESTIONNAIRE - PHQ9
SUM OF ALL RESPONSES TO PHQ9 QUESTIONS 1 & 2: 0
1. LITTLE INTEREST OR PLEASURE IN DOING THINGS: NOT AT ALL
2. FEELING DOWN, DEPRESSED OR HOPELESS: NOT AT ALL

## 2024-04-04 ASSESSMENT — PAIN - FUNCTIONAL ASSESSMENT
PAIN_FUNCTIONAL_ASSESSMENT: 0-10
PAIN_FUNCTIONAL_ASSESSMENT: 0-10

## 2024-04-04 ASSESSMENT — PAIN DESCRIPTION - PROGRESSION: CLINICAL_PROGRESSION: NOT CHANGED

## 2024-04-04 NOTE — ED TRIAGE NOTES
Pt states that he started to get cramping in his chest this morning followed by dizziness and sob. Pt states that he has a history of a fib.

## 2024-04-04 NOTE — ED PROVIDER NOTES
HPI   Chief Complaint   Patient presents with    Dizziness       Patient is a 63-year-old male with history of hypertension and atrial flutter on Eliquis presenting for evaluation of right-sided chest pressure and palpitations that started last night.  Patient states he has a history of a flutter and has had an ablation performed in 2019 for this.  He denies any precipitating factors that may have prompted this such as recent illness.  He has no other acute complaints at this time.                        [place] Coma Scale Score: 15                     Patient History   Past Medical History:   Diagnosis Date    Hypertension     Personal history of other diseases of the circulatory system     History of hypertension    Shortness of breath 03/04/2024    Stiffness of left knee 03/01/2022     Past Surgical History:   Procedure Laterality Date    CARDIAC CATHETERIZATION      OTHER SURGICAL HISTORY  02/16/2021    Knee replacement    OTHER SURGICAL HISTORY  02/16/2021    Heart surgery     No family history on file.  Social History     Tobacco Use    Smoking status: Never    Smokeless tobacco: Never   Vaping Use    Vaping Use: Never used   Substance Use Topics    Alcohol use: Yes     Alcohol/week: 2.0 standard drinks of alcohol     Types: 2 Standard drinks or equivalent per week    Drug use: Never       Physical Exam   ED Triage Vitals [04/04/24 0900]   Temperature Heart Rate Respirations BP   36.5 °C (97.7 °F) (!) 121 20 109/86      Pulse Ox Temp Source Heart Rate Source Patient Position   100 % Temporal Monitor Sitting      BP Location FiO2 (%)     Left arm --       Physical Exam  Vitals and nursing note reviewed.   Constitutional:       General: He is not in acute distress.     Appearance: He is well-developed.   HENT:      Head: Normocephalic and atraumatic.   Eyes:      Conjunctiva/sclera: Conjunctivae normal.   Cardiovascular:      Rate and Rhythm: Regular rhythm. Tachycardia present.      Heart sounds: No murmur  heard.  Pulmonary:      Effort: Pulmonary effort is normal. No respiratory distress.      Breath sounds: Normal breath sounds.   Abdominal:      Palpations: Abdomen is soft.      Tenderness: There is no abdominal tenderness.   Musculoskeletal:         General: No swelling.      Cervical back: Neck supple.   Skin:     General: Skin is warm and dry.      Capillary Refill: Capillary refill takes less than 2 seconds.   Neurological:      Mental Status: He is alert.   Psychiatric:         Mood and Affect: Mood normal.         ED Course & MDM   ED Course as of 04/04/24 1827   Thu Apr 04, 2024   0830 EKG personally interpreted by me performed at 0 858  Atrial flutter with 2-1 conduction ventricular rate 119 left axis deviation no acute ischemic changes [EF]   1010 CBC and Auto Differential [EF]   1011 Repeat ECG personally interpreted by me performed at 1008  Atrial flutter with variable conduction ventricular rate 96 left axis deviation no acute ischemic changes [EF]      ED Course User Index  [EF] Alisah N Phuc, DO         Diagnoses as of 04/04/24 1827   Typical atrial flutter (CMS/HCC)   Elevated troponin       Medical Decision Making  Parts of this chart have been completed using voice recognition software. Please excuse any errors of transcription.  My thought process and reason for plan has been formulated from the time that I saw the patient until the time of disposition and is not specific to one specific moment during their visit and furthermore my MDM encompasses this entire chart and not only this text box.      HPI: Detailed above.    Exam: A medically appropriate exam performed, outlined above, given the known history and presentation.    History obtained from: Patient    EKG: Atrial flutter without signs of acute ischemia or STEMI    Social Determinants of Health considered during this visit: Lives independently    Medications given during visit:  Medications   LORazepam (Ativan) injection  - Omnicell  Override Pull (  Return to Cabinet 4/4/24 1125)   apixaban (Eliquis) tablet 5 mg (has no administration in time range)   hydroCHLOROthiazide (HYDRODiuril) tablet 25 mg (has no administration in time range)   lisinopril tablet 5 mg (has no administration in time range)   acetaminophen (Tylenol) tablet 650 mg (has no administration in time range)     Or   acetaminophen (Tylenol) oral liquid 650 mg (has no administration in time range)     Or   acetaminophen (Tylenol) suppository 650 mg (has no administration in time range)   ondansetron (Zofran) tablet 4 mg (has no administration in time range)     Or   ondansetron (Zofran) injection 4 mg (has no administration in time range)   polyethylene glycol (Glycolax, Miralax) packet 17 g (17 g oral Not Given 4/4/24 1220)   metoprolol tartrate (Lopressor) injection 5 mg (has no administration in time range)        Diagnostic/tests  Labs Reviewed   COMPREHENSIVE METABOLIC PANEL - Abnormal       Result Value    Glucose 109 (*)     Sodium 136      Potassium 3.8      Chloride 99      Bicarbonate 25      Urea Nitrogen 14      Creatinine 0.90      eGFR >90      Calcium 8.7      Albumin 4.0      Alkaline Phosphatase 98      Total Protein 6.9      AST 23      Bilirubin, Total 1.0      ALT 23      Anion Gap 12     SERIAL TROPONIN, INITIAL ([facility]) - Abnormal    Troponin T, High Sensitivity 86 (*)    SERIAL TROPONIN,  2 HOUR ([facility]) - Abnormal    Troponin T, High Sensitivity 75 (*)    SERIAL TROPONIN, 6 HOUR ([facility]) - Abnormal    Troponin T, High Sensitivity 75 (*)    MAGNESIUM - Normal    Magnesium 1.90     TSH WITH REFLEX TO FREE T4 IF ABNORMAL - Normal    Thyroid Stimulating Hormone 1.42      Narrative:     TSH testing is performed using different testing methodology at [facility] than at other [facility] hospitals. Direct result comparisons should only be made within the same method.     TROPONIN T SERIES, HIGH SENSITIVITY (0, 2 HR, 6 HR)    Narrative:     The following orders  were created for panel order Troponin T Series, High Sensitivity (0, 2HR, 6HR).  Procedure                               Abnormality         Status                     ---------                               -----------         ------                     Serial Troponin, Initial...[[id number]]  Abnormal            Final result               Serial Troponin, 2 Hour ...[[id number]]  Abnormal            Final result               Serial Troponin, 6 Hour ...[[id number]]  Abnormal            Final result                 Please view results for these tests on the individual orders.   CBC WITH AUTO DIFFERENTIAL    WBC 7.4      nRBC 0.0      RBC 5.47      Hemoglobin 15.9      Hematocrit 48.5      MCV 89      MCH 29.1      MCHC 32.8      RDW 13.3      Platelets 245      Neutrophils % 56.3      Immature Granulocytes %, Automated 0.4      Lymphocytes % 32.9      Monocytes % 8.5      Eosinophils % 1.5      Basophils % 0.4      Neutrophils Absolute 4.15      Immature Granulocytes Absolute, Automated 0.03      Lymphocytes Absolute 2.43      Monocytes Absolute 0.63      Eosinophils Absolute 0.11      Basophils Absolute 0.03        Electrophysiology procedure    (Results Pending)        Considerations/further MDM:  Patient is a 63-year-old male presenting with palpitations and chest pain and EKG concerning for atrial flutter.  During the ER visit the patient is in no acute distress resting in hospital bed.  His vital signs are significant for tachycardia.  Patient was given metoprolol with some improvement of his tachycardia.  Cardiac enzymes with elevation of troponin compared to baseline but stable upon repeat.  EKG without acute ischemia.  Attending physician spoke with cardiac observation unit [facility].  They agree this patient is suitable for cardiac observation overnight for further trending of his cardiac enzymes and monitoring on telemetry.  This was discussed with the patient and his wife and they are agreeable.  The patient  remained stable during the ER visit and was admitted under cardiologist Dr. Juan for further management of his atrial flutter.      Procedure  Procedures     Roxanna Hancock PA-C  04/04/24 [id number]

## 2024-04-04 NOTE — PROGRESS NOTES
"Pharmacy Medication History Review    Miguel Cano \"Miguel Cano\" is a 63 y.o. male admitted for Typical atrial flutter (CMS/[facility]). Pharmacy reviewed the patient's [id number] medications and allergies for accuracy.    Medications ADDED:  none  Medications CHANGED:  none  Medications REMOVED:   Lisinopril 20mg     The list below reflects the updated PTA list. Comments regarding how patient may be taking medications differently can be found in the Admit Orders Activity  Prior to Admission Medications   Prescriptions Last Dose Informant   Eliquis 5 mg tablet 4/4/2024 Self   Sig: Take 1 tablet (5 mg) by mouth 2 times a day.   hydroCHLOROthiazide (HYDRODiuril) 25 mg tablet 4/4/2024 Self   Sig: Take 1 tablet (25 mg) by mouth once daily.   lisinopril 5 mg tablet 4/4/2024 Self   Sig: Take 1 tablet (5 mg) by mouth 2 times a day.      Facility-Administered Medications: None        The list below reflects the updated allergy list. Please review each documented allergy for additional clarification and justification.  Allergies  Reviewed by Sabrina Aguero on 4/4/2024   No Known Allergies         Pharmacy has been updated to [facility].    Sources used to complete the med history include dispense history, AVS, patient interview. Patient is a good historian.    Below are additional concerns with the patient's PTA list.  Please discontinue any prescriptions for incorrect dosing on medication duplicates prescribed.    Sabrina Aguero  Please reach out via [employer] Secure Chat for questions    "

## 2024-04-04 NOTE — CONSULTS
Inpatient consult to Cardiology  Consult performed by: KEANU Leon-CNP  Consult ordered by: Alisha Friend DO  Reason for consult: atrial flutter        History Of Present Illness:    Miguel Cano is a 63 y.o. male presenting with shortness of breath which began yesterday afternoon with a right-sided chest pain.  He was in his usual state of health and working on a car part while sitting on a chair noted a funny pain in the right upper abd area, lower chest area and felt slightly short of breath though did not have any sense of palpitations.  He was able to sleep till 4 AM and continued to feel short of breath.  This morning after doing 1 errand with his wife they presented to the emergency room for further evaluation.  In the emergency room he was found to have atrial flutter with 2-1 conduction and a rate of 119 bpm which appear typical in nature.  Treated with IV Lopressor with some transient slowing 2:1, 3:1 flutter.    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  His Adderall was discontinued and ultimately his anticoagulation was discontinued 2021.  2022 in the setting of an upper respiratory infection which he was at the tail end of, after a normal day of activity developed heart pounding shortness of breath which progressively got worse after trying to work and presented to the emergency room.  While waiting in the admission area he was placed on telemetry initially had atrial fibrillation per documentation then had short runs of nonsustained VT which ultimately became sustained witnessed by the ER doctor with near syncope.  He spontaneously terminated, shortly had atrial fibrillation, started on  "diltiazem and converted to sinus rhythm.  Troponins were not elevated though ultimately went to the cath lab for to assess for any ischemia.  Cardiac catheterization was unremarkable.  During that time he was started on IV amiodarone with oral loading and discharged amiodarone, to be on carvedilol and Eliquis.  That time he has had no recurrence of either atrial or ventricular arrhythmias.  His noting till yesterday.  His medications are geared towards his hypertension on Eliquis lisinopril which was 20 mg now 5 though believes it to be in error and hydrochlorothiazide.  In the emergency room laboratory studies were essentially unremarkable except for first troponin of 84 with second value 75.  Echocardiogram 2022:  The left ventricular chamber size is normal. Moderate concentric Left ventricle hypertrophy.Global left ventricular wall motion and contractility are within normal limits.Estimated ejection fraction is 55-59%. preserved left ventricular function.       Last Recorded Vitals:  Vitals:    04/04/24 0900 04/04/24 0936 04/04/24 0941   BP: 109/86 78/69 112/87   BP Location: Left arm  Left arm   Patient Position: Sitting  Sitting   Pulse: (!) 121 100 (!) 110   Resp: 20 16    Temp: 36.5 °C (97.7 °F)     TempSrc: Temporal     SpO2: 100% 96% 94%   Weight: 136 kg (300 lb)     Height: 1.854 m (6' 1\")         Last Labs:  CBC - 4/4/2024:  9:21 AM  7.4 15.9 245    48.5      CMP - 4/4/2024:  9:21 AM  8.7 6.9 23 --- 1.0   _ 4.0 23 98      PTT - No results in last year.  _   _ _     Hemoglobin A1C   Date/Time Value Ref Range Status   03/04/2024 09:18 AM 5.9 (H) See below % Final   10/24/2022 08:00 AM 5.7 4.0 - 6.0 % Final     Comment:     Hemoglobin A1C levels are related to mean blood glucose during the   preceding 2-3 months. The relationship table below may be used as a   general guide. Each 1% increase in HGB A1C is a reflection of an   increase in mean glucose of approximately 30 mg/dl.   Reference: Diabetes Care, " "volume 29, supplement 1 Jan. 2006                        HGB A1C ................. Approx. Mean Glucose   _______________________________________________   6%   ...............................  120 mg/dl   7%   ...............................  150 mg/dl   8%   ...............................  180 mg/dl   9%   ...............................  210 mg/dl   10%  ...............................  240 mg/dl  Performed at [address] [address]     05/11/2022 03:29 AM 5.9 4.0 - 6.0 % Final     Comment:     Hemoglobin A1C levels are related to mean blood glucose during the   preceding 2-3 months. The relationship table below may be used as a   general guide. Each 1% increase in HGB A1C is a reflection of an   increase in mean glucose of approximately 30 mg/dl.   Reference: Diabetes Care, volume 29, supplement 1 Jan. 2006                        HGB A1C ................. Approx. Mean Glucose   _______________________________________________   6%   ...............................  120 mg/dl   7%   ...............................  150 mg/dl   8%   ...............................  180 mg/dl   9%   ...............................  210 mg/dl   10%  ...............................  240 mg/dl  Performed at [address] [address]       LDL Calculated   Date/Time Value Ref Range Status   03/04/2024 09:18 AM 69 65 - 130 mg/dL Final   10/24/2022 08:00 AM 84 65 - 130 MG/DL Final   05/11/2022 03:29 AM 61 (L) 65 - 130 MG/DL Final      Last I/O:  No intake/output data recorded.    Past Cardiology Tests (Last 3 Years):  EKG:  ECG 12 lead 04/04/2024 (Preliminary)      ECG 12 lead 04/04/2024 (Preliminary)      ECG 12 lead (Clinic Performed) 12/26/2023    Echo:      Ejection Fractions:  No results found for: \"EF\"  Cath: 2022  cardiac catheterization: LMCA-large caliber vessel that bifurcates into the LAD and left circumflex arteries. It is angiographically normal.Lad-also a very large caliber " vessel giving rise to 2 small to moderate diagonal branches and a recurrent apical branch. There are luminal irregularities throughout the LAD. There is sluggish flow throughout the LAD system without any identifiable obstruction.Circumflex-also large caliber vessel, codominant for posterior circulation. It gives rise to 1 large branched obtuse marginal, a small posterolateral branch, and a small posterior descending artery. There are luminal irregularities throughout the circumflex. Flow through the circumflex is also slow to a lesser degree without any identifiable obstruction.Right coronary artery-codominant vessel giving rise to a small posterior descending artery. It also gives rise to 2 significant RV marginal branches. The 1st RV marginal branch has had very sluggish flow without any identifiable obstruction. Otherwise there are luminal irregularities throughout the right coronary artery     Past Medical History:  He has a past medical history of Atrial fibrillation (CMS/[facility]), Hypertension, Personal history of other diseases of the circulatory system, Shortness of breath (03/04/2024), and Stiffness of left knee (03/01/2022).    Past Surgical History:  He has a past surgical history that includes Other surgical history (02/16/2021); Other surgical history (02/16/2021); and Cardiac catheterization.      Social History:  He reports that he has never smoked. He has never used smokeless tobacco. He reports current alcohol use of about 2.0 standard drinks of alcohol per week. He reports that he does not use drugs.    Family History:  No family history on file.     Allergies:  Patient has no known allergies.    Inpatient Medications:  Scheduled medications   Medication Dose Route Frequency    apixaban  5 mg oral BID    [START ON 4/5/2024] hydroCHLOROthiazide  25 mg oral Daily    lisinopril  5 mg oral BID    LORazepam        polyethylene glycol  17 g oral Daily     PRN medications   Medication    acetaminophen    Or     acetaminophen    Or    acetaminophen    LORazepam    metoprolol    ondansetron    Or    ondansetron     Continuous Medications   Medication Dose Last Rate     Outpatient Medications:  Current Outpatient Medications   Medication Instructions    Eliquis 5 mg, oral, 2 times daily    hydroCHLOROthiazide (HYDRODIURIL) 25 mg, oral, Daily    lisinopril 5 mg, oral, 2 times daily     Results for orders placed or performed during the hospital encounter of 04/04/24 (from the past 24 hour(s))   ECG 12 lead   Result Value Ref Range    Ventricular Rate 119 BPM    Atrial Rate 238 BPM    QRS Duration 162 ms    QT Interval 330 ms    QTC Calculation(Bazett) 464 ms    P Axis 259 degrees    R Axis -72 degrees    T Axis 27 degrees    QRS Count 19 beats    Q Onset 211 ms    P Onset 135 ms    P Offset 208 ms    T Offset 376 ms    QTC Fredericia 414 ms   CBC and Auto Differential   Result Value Ref Range    WBC 7.4 4.4 - 11.3 x10*3/uL    nRBC 0.0 0.0 - 0.0 /100 WBCs    RBC 5.47 4.50 - 5.90 x10*6/uL    Hemoglobin 15.9 13.5 - 17.5 g/dL    Hematocrit 48.5 41.0 - 52.0 %    MCV 89 80 - 100 fL    MCH 29.1 26.0 - 34.0 pg    MCHC 32.8 32.0 - 36.0 g/dL    RDW 13.3 11.5 - 14.5 %    Platelets 245 150 - 450 x10*3/uL    Neutrophils % 56.3 40.0 - 80.0 %    Immature Granulocytes %, Automated 0.4 0.0 - 0.9 %    Lymphocytes % 32.9 13.0 - 44.0 %    Monocytes % 8.5 2.0 - 10.0 %    Eosinophils % 1.5 0.0 - 6.0 %    Basophils % 0.4 0.0 - 2.0 %    Neutrophils Absolute 4.15 1.20 - 7.70 x10*3/uL    Immature Granulocytes Absolute, Automated 0.03 0.00 - 0.70 x10*3/uL    Lymphocytes Absolute 2.43 1.20 - 4.80 x10*3/uL    Monocytes Absolute 0.63 0.10 - 1.00 x10*3/uL    Eosinophils Absolute 0.11 0.00 - 0.70 x10*3/uL    Basophils Absolute 0.03 0.00 - 0.10 x10*3/uL   Comprehensive Metabolic Panel   Result Value Ref Range    Glucose 109 (H) 65 - 99 mg/dL    Sodium 136 133 - 145 mmol/L    Potassium 3.8 3.4 - 5.1 mmol/L    Chloride 99 97 - 107 mmol/L    Bicarbonate 25 24  - 31 mmol/L    Urea Nitrogen 14 8 - 25 mg/dL    Creatinine 0.90 0.40 - 1.60 mg/dL    eGFR >90 >60 mL/min/1.73m*2    Calcium 8.7 8.5 - 10.4 mg/dL    Albumin 4.0 3.5 - 5.0 g/dL    Alkaline Phosphatase 98 35 - 125 U/L    Total Protein 6.9 5.9 - 7.9 g/dL    AST 23 5 - 40 U/L    Bilirubin, Total 1.0 0.1 - 1.2 mg/dL    ALT 23 5 - 40 U/L    Anion Gap 12 <=19 mmol/L   Magnesium   Result Value Ref Range    Magnesium 1.90 1.60 - 3.10 mg/dL   Serial Troponin, Initial ([facility])   Result Value Ref Range    Troponin T, High Sensitivity 86 (HH) <=14 ng/L   ECG 12 lead   Result Value Ref Range    Ventricular Rate 96 BPM    Atrial Rate 241 BPM    QRS Duration 132 ms    QT Interval 422 ms    QTC Calculation(Bazett) 533 ms    P Axis 61 degrees    R Axis -50 degrees    T Axis -4 degrees    QRS Count 15 beats    Q Onset 211 ms    P Onset 139 ms    P Offset 199 ms    T Offset 422 ms    QTC Fredericia 493 ms   Serial Troponin, 2 Hour ([facility])   Result Value Ref Range    Troponin T, High Sensitivity 75 (HH) <=14 ng/L   TSH with reflex to Free T4 if abnormal   Result Value Ref Range    Thyroid Stimulating Hormone 1.42 0.27 - 4.20 mIU/L       Physical Exam:  Gen: A+O x 3, no acute distress  HEENT: Normocephalic/atraumatic pupils equal react light  Neck: No JVD, upstrokes and volumes nl, no bruits   Lung: CTA, nl AP diameter  CV:  nl sounding S1, S2, no murmur, PMI nondisplaced  Abdomen: soft, non tender, + BS x 4   Extremities: warm to touch, palpable pulses bilaterally, no edema   Neuro: no neurologic deficits     Assessment/Plan   63-year-old male with a history of atrial flutter dating back to 2018, underwent radiofrequency ablation for typical atrial flutter in 2019 maintained for short period on antiarrhythmic drug therapy and anticoagulation which ultimately was discontinued prior to 2022 recurrence 2022 which is documented to be atrial fibrillation with thereafter witnessed sustained VT self terminating, underwent cardiac catheterization  which was essentially unremarkable notable for slow flow though no obstructive anatomy.  Was reinitiated on anticoagulation with amiodarone and beta-blocker though both amiodarone and beta-blocker have been discontinued ,  Of note there may have been a time where his rates were in the 50s denies any lightheadedness near-syncope or syncope  Presents with recurrent typical atrial flutter, shortness of breath  -Treated with IV metoprolol with some improvement  -Has not missed any doses of Eliquis  -Would benefit from repeat ablation may consider ablation of atrial fibrillation though only 1 episode in 2022 though may have had recurrence without symptom  -Would repeat echocardiogram to assess left atrial size and LV function  -proBNP and thyroid  -Start low-dose Toprol extended release 25 mg twice daily  -Not hold anticoagulation for any reason please  -If has  recurrent abdominal pain would obtain an abdominal CT  -Would start PPI  -Decide about the timing of ablation, shortness of breath may be reasonable to perform prior to discharge  -Discussed with Dr. Dana Santos, APRN-CNP

## 2024-04-04 NOTE — PROGRESS NOTES
IN BRIEF    History: 63-year-old male presents with complaints of shortness of breath with exertion and palpitations reminiscent of atrial flutter that he has had in the past.  He is status post cardiac ablation in 2019 and has been off of any rate or rhythm control medication as he has been doing well.  He is still taking blood thinning medication.    Exam: Heart is a regular rhythm though slightly tachycardic, lungs are clear to auscultation       ED COURSE   MDM: Patient presents with atrial flutter with a 2-1 conduction.  He is given metoprolol with a slight decrease in the rate but still in atrial flutter with a variable conduction.  Case is discussed with electrophysiology nurse practitioner.  Electrolytes are stable, however troponin is elevated above but may be considered normal for patient's age, comorbidities, and current presentation.  Given this he will be admitted to the cardiac observation unit.  This discussed with the cardiac observation NP patient is excepted to the care of Dr. Juan.  Patient and wife are agreeable with this plan of care as well.    Critical Care Time   TOTAL CRITICAL CARE TIME (EXCLUDING SEPARATE BILLABLE PROCEDURES): 37 min nonconcurrent time  CC time assessed for complex medical decision making, coordination of care between providers and specialists, discussion with family (if patient unable to contribute), documentation of findings, and interpretation of labwork and imaging.    I personally saw the patient and made/approved the management plan and take responsibility for the patient management.  Parts of this chart were completed with dictation software, please excuse any errors in transcription.     Alisha Friend, DO  11:00 AM

## 2024-04-04 NOTE — H&P
History Of Present Illness  Miguel Cano is a 63 y.o. male presenting with complaints of R sided upper abdominal cramping, mild headache, lightheadedness, and shortness of breath. He reports yesterday he was working on his car and the RUQ cramping started. This morning the cramping continued, and he started feeling short of breath, lightheaded, and developed a mild headache. He reports his symptoms are worse with activity. He states that this is how he felt the last time he developed a flutter, which was 2 years ago. He denies any recent illness, chest pain, GI upset, fevers, chills. His wife is at the bedside.    ED Course  ECG with a flutter with 2-1 conduction  Given 5 mg IV metoprolol with slight decrease in rate, but still in a flutter with variable conduction  HScTn: 86  CMP and CBC unremarkable    Prior Cardiac History  Cardiac catheterization 5/2022 with mild nonobstructive CAD  Echo 10/2019 with EF 60-65%, L atria mildly dilated, mild mitral regurg, trace pulm regurg, mild tricuspic regurg      Past Medical History  Past Medical History:   Diagnosis Date    Atrial fibrillation (CMS/[facility])     Hypertension     Personal history of other diseases of the circulatory system     History of hypertension    Shortness of breath 03/04/2024    Stiffness of left knee 03/01/2022       Surgical History  Past Surgical History:   Procedure Laterality Date    CARDIAC CATHETERIZATION      OTHER SURGICAL HISTORY  02/16/2021    Knee replacement    OTHER SURGICAL HISTORY  02/16/2021    Heart surgery        Social History  He reports that he has never smoked. He has never used smokeless tobacco. He reports current alcohol use of about 2.0 standard drinks of alcohol per week. He reports that he does not use drugs.    Family History  No family history on file.     Allergies  Patient has no known allergies.    Review of Systems   Constitutional: Negative.    HENT: Negative.     Eyes: Negative.    Respiratory:  Positive for  shortness of breath. Negative for apnea, cough, choking, chest tightness, wheezing and stridor.    Cardiovascular: Negative.    Gastrointestinal: Negative.    Endocrine: Negative.    Genitourinary: Negative.    Musculoskeletal: Negative.    Allergic/Immunologic: Negative.    Neurological:  Positive for light-headedness and headaches.   Hematological: Negative.    Psychiatric/Behavioral: Negative.          Physical Exam  Vitals reviewed.   Constitutional:       General: He is not in acute distress.     Appearance: He is obese. He is not ill-appearing, toxic-appearing or diaphoretic.   HENT:      Head: Normocephalic and atraumatic.      Nose: Nose normal.      Mouth/Throat:      Mouth: Mucous membranes are moist.   Eyes:      Extraocular Movements: Extraocular movements intact.   Cardiovascular:      Rate and Rhythm: Tachycardia present. Rhythm irregular.      Pulses: Normal pulses.      Heart sounds: Normal heart sounds. No murmur heard.     No gallop.   Pulmonary:      Effort: Pulmonary effort is normal. No respiratory distress.      Breath sounds: Normal breath sounds. No wheezing, rhonchi or rales.   Abdominal:      General: Bowel sounds are normal. There is no distension.      Palpations: Abdomen is soft.      Tenderness: There is no abdominal tenderness. There is no guarding or rebound.   Musculoskeletal:         General: No deformity or signs of injury. Normal range of motion.      Cervical back: Normal range of motion.      Right lower leg: No edema.      Left lower leg: No edema.   Skin:     General: Skin is warm and dry.      Capillary Refill: Capillary refill takes less than 2 seconds.      Findings: No bruising, erythema, lesion or rash.   Neurological:      General: No focal deficit present.      Mental Status: He is alert and oriented to person, place, and time.      Cranial Nerves: No cranial nerve deficit.      Sensory: No sensory deficit.   Psychiatric:         Mood and Affect: Mood normal.          "Behavior: Behavior normal.        Last Recorded Vitals  Blood pressure 112/87, pulse (!) 110, temperature 36.5 °C (97.7 °F), temperature source Temporal, resp. rate 16, height 1.854 m (6' 1\"), weight 136 kg (300 lb), SpO2 94 %.    Relevant Results  Results for orders placed or performed during the hospital encounter of 04/04/24 (from the past 24 hour(s))   CBC and Auto Differential   Result Value Ref Range    WBC 7.4 4.4 - 11.3 x10*3/uL    nRBC 0.0 0.0 - 0.0 /100 WBCs    RBC 5.47 4.50 - 5.90 x10*6/uL    Hemoglobin 15.9 13.5 - 17.5 g/dL    Hematocrit 48.5 41.0 - 52.0 %    MCV 89 80 - 100 fL    MCH 29.1 26.0 - 34.0 pg    MCHC 32.8 32.0 - 36.0 g/dL    RDW 13.3 11.5 - 14.5 %    Platelets 245 150 - 450 x10*3/uL    Neutrophils % 56.3 40.0 - 80.0 %    Immature Granulocytes %, Automated 0.4 0.0 - 0.9 %    Lymphocytes % 32.9 13.0 - 44.0 %    Monocytes % 8.5 2.0 - 10.0 %    Eosinophils % 1.5 0.0 - 6.0 %    Basophils % 0.4 0.0 - 2.0 %    Neutrophils Absolute 4.15 1.20 - 7.70 x10*3/uL    Immature Granulocytes Absolute, Automated 0.03 0.00 - 0.70 x10*3/uL    Lymphocytes Absolute 2.43 1.20 - 4.80 x10*3/uL    Monocytes Absolute 0.63 0.10 - 1.00 x10*3/uL    Eosinophils Absolute 0.11 0.00 - 0.70 x10*3/uL    Basophils Absolute 0.03 0.00 - 0.10 x10*3/uL   Comprehensive Metabolic Panel   Result Value Ref Range    Glucose 109 (H) 65 - 99 mg/dL    Sodium 136 133 - 145 mmol/L    Potassium 3.8 3.4 - 5.1 mmol/L    Chloride 99 97 - 107 mmol/L    Bicarbonate 25 24 - 31 mmol/L    Urea Nitrogen 14 8 - 25 mg/dL    Creatinine 0.90 0.40 - 1.60 mg/dL    eGFR >90 >60 mL/min/1.73m*2    Calcium 8.7 8.5 - 10.4 mg/dL    Albumin 4.0 3.5 - 5.0 g/dL    Alkaline Phosphatase 98 35 - 125 U/L    Total Protein 6.9 5.9 - 7.9 g/dL    AST 23 5 - 40 U/L    Bilirubin, Total 1.0 0.1 - 1.2 mg/dL    ALT 23 5 - 40 U/L    Anion Gap 12 <=19 mmol/L   Magnesium   Result Value Ref Range    Magnesium 1.90 1.60 - 3.10 mg/dL   Serial Troponin, Initial ([facility])   Result Value Ref " Range    Troponin T, High Sensitivity 86 (HH) <=14 ng/L    No results found.      Assessment/Plan   Principal Problem:    Typical atrial flutter (CMS/HCC)    Atrial flutter  -Has prior history of a fib and a flutter, follows with [facility]  -Was not on rate control meds but previously had been on amiodarone; had not had an episode of a fib or flutter in 2 years per patient  -On Eliquis  -EP following; likely will restart amiodarone    Elevated troponin  -Initial troponin 86; will trend  -Suspect 2/2 above but will monitor  -Continuous telemetry  -NPO after midnight in case further testing or procedures in AM    HTN  -BP controlled  -Continue home Lisinopril and hydrochlorothiazide  -Lisinopril dose recently lowered from 20 mg to 5 mg by PCP    CAD   -Hx cardiac catheterization in 05/2022 by Dr. Juan, showed mild nonobstructive coronary artery disease    Overall Impression / Plan  Recurrence of a flutter. EP following; likely restart amiodarone. Will monitor telemetry and trend troponins. NPO after midnight in case further testing or procedures warranted.    I spent 60 minutes in the professional and overall care of this patient.      Janessa Oreilly, APRN-CNP

## 2024-04-05 ENCOUNTER — APPOINTMENT (OUTPATIENT)
Dept: CARDIOLOGY | Facility: HOSPITAL | Age: 64
End: 2024-04-05
Payer: OTHER GOVERNMENT

## 2024-04-05 ENCOUNTER — PHARMACY VISIT (OUTPATIENT)
Dept: PHARMACY | Facility: CLINIC | Age: 64
End: 2024-04-05
Payer: COMMERCIAL

## 2024-04-05 ENCOUNTER — TELEPHONE (OUTPATIENT)
Dept: PREOP | Facility: HOSPITAL | Age: 64
End: 2024-04-05

## 2024-04-05 VITALS
OXYGEN SATURATION: 100 % | DIASTOLIC BLOOD PRESSURE: 97 MMHG | BODY MASS INDEX: 39.76 KG/M2 | SYSTOLIC BLOOD PRESSURE: 149 MMHG | TEMPERATURE: 97.9 F | RESPIRATION RATE: 18 BRPM | HEIGHT: 73 IN | HEART RATE: 67 BPM | WEIGHT: 300 LBS

## 2024-04-05 LAB
ALBUMIN SERPL-MCNC: 3.8 G/DL (ref 3.5–5)
ALP BLD-CCNC: 89 U/L (ref 35–125)
ALT SERPL-CCNC: 20 U/L (ref 5–40)
ANION GAP SERPL CALC-SCNC: 10 MMOL/L
AORTIC VALVE MEAN GRADIENT: 2.7 MMHG
AORTIC VALVE PEAK VELOCITY: 1.17 M/S
AST SERPL-CCNC: 17 U/L (ref 5–40)
ATRIAL RATE: 238 BPM
ATRIAL RATE: 241 BPM
AV PEAK GRADIENT: 5.5 MMHG
AVA (PEAK VEL): 1.84 CM2
AVA (VTI): 1.87 CM2
BILIRUB SERPL-MCNC: 0.7 MG/DL (ref 0.1–1.2)
BUN SERPL-MCNC: 14 MG/DL (ref 8–25)
CALCIUM SERPL-MCNC: 8.8 MG/DL (ref 8.5–10.4)
CHLORIDE SERPL-SCNC: 102 MMOL/L (ref 97–107)
CO2 SERPL-SCNC: 26 MMOL/L (ref 24–31)
CREAT SERPL-MCNC: 0.9 MG/DL (ref 0.4–1.6)
EGFRCR SERPLBLD CKD-EPI 2021: >90 ML/MIN/1.73M*2
EJECTION FRACTION APICAL 4 CHAMBER: 18.9
ERYTHROCYTE [DISTWIDTH] IN BLOOD BY AUTOMATED COUNT: 13 % (ref 11.5–14.5)
GLUCOSE SERPL-MCNC: 103 MG/DL (ref 65–99)
HCT VFR BLD AUTO: 48.8 % (ref 41–52)
HGB BLD-MCNC: 16.2 G/DL (ref 13.5–17.5)
LEFT ATRIUM VOLUME AREA LENGTH INDEX BSA: 24.1 ML/M2
LEFT VENTRICLE INTERNAL DIMENSION DIASTOLE: 6.08 CM (ref 3.5–6)
LEFT VENTRICULAR OUTFLOW TRACT DIAMETER: 2.14 CM
LV EJECTION FRACTION BIPLANE: 35 %
MCH RBC QN AUTO: 29 PG (ref 26–34)
MCHC RBC AUTO-ENTMCNC: 33.2 G/DL (ref 32–36)
MCV RBC AUTO: 88 FL (ref 80–100)
NRBC BLD-RTO: 0 /100 WBCS (ref 0–0)
P AXIS: 259 DEGREES
P AXIS: 61 DEGREES
P OFFSET: 199 MS
P OFFSET: 208 MS
P ONSET: 135 MS
P ONSET: 139 MS
PLATELET # BLD AUTO: 228 X10*3/UL (ref 150–450)
POTASSIUM SERPL-SCNC: 4 MMOL/L (ref 3.4–5.1)
PROT SERPL-MCNC: 6.3 G/DL (ref 5.9–7.9)
Q ONSET: 211 MS
Q ONSET: 211 MS
QRS COUNT: 15 BEATS
QRS COUNT: 19 BEATS
QRS DURATION: 132 MS
QRS DURATION: 162 MS
QT INTERVAL: 330 MS
QT INTERVAL: 422 MS
QTC CALCULATION(BAZETT): 464 MS
QTC CALCULATION(BAZETT): 533 MS
QTC FREDERICIA: 414 MS
QTC FREDERICIA: 493 MS
R AXIS: -50 DEGREES
R AXIS: -72 DEGREES
RBC # BLD AUTO: 5.58 X10*6/UL (ref 4.5–5.9)
SODIUM SERPL-SCNC: 138 MMOL/L (ref 133–145)
T AXIS: -4 DEGREES
T AXIS: 27 DEGREES
T OFFSET: 376 MS
T OFFSET: 422 MS
VENTRICULAR RATE: 119 BPM
VENTRICULAR RATE: 96 BPM
WBC # BLD AUTO: 7.7 X10*3/UL (ref 4.4–11.3)

## 2024-04-05 PROCEDURE — 99232 SBSQ HOSP IP/OBS MODERATE 35: CPT

## 2024-04-05 PROCEDURE — 36415 COLL VENOUS BLD VENIPUNCTURE: CPT | Performed by: NURSE PRACTITIONER

## 2024-04-05 PROCEDURE — RXMED WILLOW AMBULATORY MEDICATION CHARGE

## 2024-04-05 PROCEDURE — 93306 TTE W/DOPPLER COMPLETE: CPT | Performed by: INTERNAL MEDICINE

## 2024-04-05 PROCEDURE — 2500000001 HC RX 250 WO HCPCS SELF ADMINISTERED DRUGS (ALT 637 FOR MEDICARE OP): Performed by: NURSE PRACTITIONER

## 2024-04-05 PROCEDURE — 2500000001 HC RX 250 WO HCPCS SELF ADMINISTERED DRUGS (ALT 637 FOR MEDICARE OP): Performed by: REGISTERED NURSE

## 2024-04-05 PROCEDURE — 93306 TTE W/DOPPLER COMPLETE: CPT

## 2024-04-05 PROCEDURE — 85027 COMPLETE CBC AUTOMATED: CPT | Performed by: NURSE PRACTITIONER

## 2024-04-05 PROCEDURE — 80053 COMPREHEN METABOLIC PANEL: CPT | Performed by: NURSE PRACTITIONER

## 2024-04-05 PROCEDURE — G0378 HOSPITAL OBSERVATION PER HR: HCPCS

## 2024-04-05 PROCEDURE — 99232 SBSQ HOSP IP/OBS MODERATE 35: CPT | Performed by: INTERNAL MEDICINE

## 2024-04-05 RX ORDER — METOPROLOL SUCCINATE 25 MG/1
25 TABLET, EXTENDED RELEASE ORAL 2 TIMES DAILY
Qty: 60 TABLET | Refills: 0 | Status: SHIPPED | OUTPATIENT
Start: 2024-04-05 | End: 2024-06-06 | Stop reason: ALTCHOICE

## 2024-04-05 RX ADMIN — LISINOPRIL 5 MG: 5 TABLET ORAL at 11:10

## 2024-04-05 RX ADMIN — PANTOPRAZOLE SODIUM 40 MG: 40 TABLET, DELAYED RELEASE ORAL at 06:08

## 2024-04-05 RX ADMIN — METOPROLOL SUCCINATE 25 MG: 25 TABLET, FILM COATED, EXTENDED RELEASE ORAL at 11:10

## 2024-04-05 RX ADMIN — APIXABAN 5 MG: 5 TABLET, FILM COATED ORAL at 11:10

## 2024-04-05 RX ADMIN — HYDROCHLOROTHIAZIDE 25 MG: 25 TABLET ORAL at 11:10

## 2024-04-05 SDOH — ECONOMIC STABILITY: FOOD INSECURITY: WITHIN THE PAST 12 MONTHS, THE FOOD YOU BOUGHT JUST DIDN'T LAST AND YOU DIDN'T HAVE MONEY TO GET MORE.: NEVER TRUE

## 2024-04-05 SDOH — SOCIAL STABILITY: SOCIAL NETWORK: ARE YOU MARRIED, WIDOWED, DIVORCED, SEPARATED, NEVER MARRIED, OR LIVING WITH A PARTNER?: MARRIED

## 2024-04-05 SDOH — HEALTH STABILITY: MENTAL HEALTH: HOW OFTEN DO YOU HAVE A DRINK CONTAINING ALCOHOL?: MONTHLY OR LESS

## 2024-04-05 SDOH — SOCIAL STABILITY: SOCIAL NETWORK
IN A TYPICAL WEEK, HOW MANY TIMES DO YOU TALK ON THE PHONE WITH FAMILY, FRIENDS, OR NEIGHBORS?: MORE THAN THREE TIMES A WEEK

## 2024-04-05 SDOH — ECONOMIC STABILITY: INCOME INSECURITY: IN THE PAST 12 MONTHS, HAS THE ELECTRIC, GAS, OIL, OR WATER COMPANY THREATENED TO SHUT OFF SERVICE IN YOUR HOME?: NO

## 2024-04-05 SDOH — SOCIAL STABILITY: SOCIAL INSECURITY: WITHIN THE LAST YEAR, HAVE YOU BEEN AFRAID OF YOUR PARTNER OR EX-PARTNER?: NO

## 2024-04-05 SDOH — SOCIAL STABILITY: SOCIAL INSECURITY: WITHIN THE LAST YEAR, HAVE YOU BEEN HUMILIATED OR EMOTIONALLY ABUSED IN OTHER WAYS BY YOUR PARTNER OR EX-PARTNER?: NO

## 2024-04-05 SDOH — ECONOMIC STABILITY: FOOD INSECURITY: WITHIN THE PAST 12 MONTHS, YOU WORRIED THAT YOUR FOOD WOULD RUN OUT BEFORE YOU GOT MONEY TO BUY MORE.: NEVER TRUE

## 2024-04-05 SDOH — SOCIAL STABILITY: SOCIAL NETWORK: HOW OFTEN DO YOU ATTEND CHURCH OR RELIGIOUS SERVICES?: NEVER

## 2024-04-05 SDOH — SOCIAL STABILITY: SOCIAL NETWORK: HOW OFTEN DO YOU ATTENT MEETINGS OF THE CLUB OR ORGANIZATION YOU BELONG TO?: MORE THAN 4 TIMES PER YEAR

## 2024-04-05 SDOH — HEALTH STABILITY: MENTAL HEALTH
STRESS IS WHEN SOMEONE FEELS TENSE, NERVOUS, ANXIOUS, OR CAN'T SLEEP AT NIGHT BECAUSE THEIR MIND IS TROUBLED. HOW STRESSED ARE YOU?: NOT AT ALL

## 2024-04-05 SDOH — SOCIAL STABILITY: SOCIAL INSECURITY
WITHIN THE LAST YEAR, HAVE YOU BEEN KICKED, HIT, SLAPPED, OR OTHERWISE PHYSICALLY HURT BY YOUR PARTNER OR EX-PARTNER?: NO

## 2024-04-05 SDOH — SOCIAL STABILITY: SOCIAL INSECURITY
WITHIN THE LAST YEAR, HAVE TO BEEN RAPED OR FORCED TO HAVE ANY KIND OF SEXUAL ACTIVITY BY YOUR PARTNER OR EX-PARTNER?: NO

## 2024-04-05 SDOH — SOCIAL STABILITY: SOCIAL NETWORK: HOW OFTEN DO YOU GET TOGETHER WITH FRIENDS OR RELATIVES?: MORE THAN THREE TIMES A WEEK

## 2024-04-05 SDOH — HEALTH STABILITY: PHYSICAL HEALTH: ON AVERAGE, HOW MANY MINUTES DO YOU ENGAGE IN EXERCISE AT THIS LEVEL?: 60 MIN

## 2024-04-05 SDOH — HEALTH STABILITY: MENTAL HEALTH: HOW OFTEN DO YOU HAVE 6 OR MORE DRINKS ON ONE OCCASION?: NEVER

## 2024-04-05 SDOH — HEALTH STABILITY: MENTAL HEALTH: HOW MANY STANDARD DRINKS CONTAINING ALCOHOL DO YOU HAVE ON A TYPICAL DAY?: 1 OR 2

## 2024-04-05 SDOH — SOCIAL STABILITY: SOCIAL NETWORK
DO YOU BELONG TO ANY CLUBS OR ORGANIZATIONS SUCH AS CHURCH GROUPS UNIONS, FRATERNAL OR ATHLETIC GROUPS, OR SCHOOL GROUPS?: YES

## 2024-04-05 SDOH — HEALTH STABILITY: PHYSICAL HEALTH: ON AVERAGE, HOW MANY DAYS PER WEEK DO YOU ENGAGE IN MODERATE TO STRENUOUS EXERCISE (LIKE A BRISK WALK)?: 2 DAYS

## 2024-04-05 ASSESSMENT — PAIN - FUNCTIONAL ASSESSMENT: PAIN_FUNCTIONAL_ASSESSMENT: 0-10

## 2024-04-05 ASSESSMENT — PAIN SCALES - GENERAL: PAINLEVEL_OUTOF10: 0 - NO PAIN

## 2024-04-05 ASSESSMENT — LIFESTYLE VARIABLES
SKIP TO QUESTIONS 9-10: 1
AUDIT-C TOTAL SCORE: 1

## 2024-04-05 NOTE — PROGRESS NOTES
04/05/24 8744   Discharge Planning   Patient expects to be discharged to: Home with spouse.

## 2024-04-05 NOTE — PROGRESS NOTES
Subjective Data:  The patient remains in atrial flutter with slightly improved rate control and variable conduction with heart rates around 100 bpm.    Overnight Events:    No new events overnight.     Objective Data:  Last Recorded Vitals:  Vitals:    04/04/24 1927 04/05/24 0145 04/05/24 0607 04/05/24 0812   BP: 124/90 119/78 146/85 (!) 144/99   BP Location: Left arm Left arm Left arm Left arm   Patient Position: Sitting Lying Lying Lying   Pulse: 104 104 98 87   Resp: 18 19 18 18   Temp: 36.4 °C (97.5 °F) 36.5 °C (97.7 °F) 36.6 °C (97.9 °F) 36.5 °C (97.7 °F)   TempSrc: Oral Oral Oral Oral   SpO2: 97% 98% 99% 98%   Weight:       Height:           Last Labs:  CBC - 4/5/2024:  6:28 AM  7.7 16.2 228    48.8      CMP - 4/5/2024:  6:28 AM  8.8 6.3 17 --- 0.7   _ 3.8 20 89      PTT - No results in last year.  _   _ _     HGBA1C   Date/Time Value Ref Range Status   03/04/2024 09:18 AM 5.9 See below % Final   10/24/2022 08:00 AM 5.7 4.0 - 6.0 % Final     Comment:     Hemoglobin A1C levels are related to mean blood glucose during the   preceding 2-3 months. The relationship table below may be used as a   general guide. Each 1% increase in HGB A1C is a reflection of an   increase in mean glucose of approximately 30 mg/dl.   Reference: Diabetes Care, volume 29, supplement 1 Jan. 2006                        HGB A1C ................. Approx. Mean Glucose   _______________________________________________   6%   ...............................  120 mg/dl   7%   ...............................  150 mg/dl   8%   ...............................  180 mg/dl   9%   ...............................  210 mg/dl   10%  ...............................  240 mg/dl  Performed at [address] [address]     05/11/2022 03:29 AM 5.9 4.0 - 6.0 % Final     Comment:     Hemoglobin A1C levels are related to mean blood glucose during the   preceding 2-3 months. The relationship table below may be used as a   general guide. Each  "1% increase in HGB A1C is a reflection of an   increase in mean glucose of approximately 30 mg/dl.   Reference: Diabetes Care, volume 29, supplement 1 Jan. 2006                        HGB A1C ................. Approx. Mean Glucose   _______________________________________________   6%   ...............................  120 mg/dl   7%   ...............................  150 mg/dl   8%   ...............................  180 mg/dl   9%   ...............................  210 mg/dl   10%  ...............................  240 mg/dl  Performed at [address] [address]       LDLCALC   Date/Time Value Ref Range Status   03/04/2024 09:18 AM 69 65 - 130 mg/dL Final   10/24/2022 08:00 AM 84 65 - 130 MG/DL Final   05/11/2022 03:29 AM 61 65 - 130 MG/DL Final      Last I/O:  No intake/output data recorded.    Past Cardiology Tests (Last 3 Years):  EKG:  ECG 12 lead 04/04/2024 (Preliminary)      ECG 12 lead 04/04/2024 (Preliminary)      ECG 12 lead (Clinic Performed) 12/26/2023    Echo:  No results found for this or any previous visit from the past 1095 days.    Ejection Fractions:  No results found for: \"EF\"  Cath:  No results found for this or any previous visit from the past 1095 days.    Stress Test:  No results found for this or any previous visit from the past 1095 days.    Cardiac Imaging:  No results found for this or any previous visit from the past 1095 days.      Inpatient Medications:  Scheduled medications   Medication Dose Route Frequency    apixaban  5 mg oral BID    hydroCHLOROthiazide  25 mg oral Daily    lisinopril  5 mg oral BID    metoprolol succinate XL  25 mg oral BID    pantoprazole  40 mg oral Daily before breakfast    polyethylene glycol  17 g oral Daily     PRN medications   Medication    acetaminophen    Or    acetaminophen    Or    acetaminophen    metoprolol    ondansetron    Or    ondansetron     Continuous Medications   Medication Dose Last Rate       Physical Exam:  HEENT: " Normocephalic  Neck: No bruits  Cardiovascular exam: Rapid S1 and S2 with variable rates and somewhat irregular 1 out of 6 systolic murmur best heard at the apex  Lungs: Clear to auscultation bilaterally  Abdomen: Soft nontender nondistended  Extremities: Minimal edema     Assessment/Plan   Atrial flutter  The patient has recurrent atrial flutter and is amenable to catheter-based therapy.  He does not wish to stay in the hospital over the weekend which I think is reasonable and he will be discharged today to come back Monday morning for catheter-based therapy.  He understands this and wishes to proceed.  Continue beta-blockers and anticoagulation.  Peripheral IV 04/04/24 20 G Right Antecubital (Active)   Site Assessment Clean;Dry;Intact 04/05/24 0809   Dressing Status Clean;Dry 04/05/24 0809   Number of days: 1       Code Status:  Full Code    I spent 30 minutes in the professional and overall care of this patient.        Maximilian Cortez MD

## 2024-04-05 NOTE — H&P (VIEW-ONLY)
Subjective Data:  The patient remains in atrial flutter with slightly improved rate control and variable conduction with heart rates around 100 bpm.    Overnight Events:    No new events overnight.     Objective Data:  Last Recorded Vitals:  Vitals:    04/04/24 1927 04/05/24 0145 04/05/24 0607 04/05/24 0812   BP: 124/90 119/78 146/85 (!) 144/99   BP Location: Left arm Left arm Left arm Left arm   Patient Position: Sitting Lying Lying Lying   Pulse: 104 104 98 87   Resp: 18 19 18 18   Temp: 36.4 °C (97.5 °F) 36.5 °C (97.7 °F) 36.6 °C (97.9 °F) 36.5 °C (97.7 °F)   TempSrc: Oral Oral Oral Oral   SpO2: 97% 98% 99% 98%   Weight:       Height:           Last Labs:  CBC - 4/5/2024:  6:28 AM  7.7 16.2 228    48.8      CMP - 4/5/2024:  6:28 AM  8.8 6.3 17 --- 0.7   _ 3.8 20 89      PTT - No results in last year.  _   _ _     HGBA1C   Date/Time Value Ref Range Status   03/04/2024 09:18 AM 5.9 See below % Final   10/24/2022 08:00 AM 5.7 4.0 - 6.0 % Final     Comment:     Hemoglobin A1C levels are related to mean blood glucose during the   preceding 2-3 months. The relationship table below may be used as a   general guide. Each 1% increase in HGB A1C is a reflection of an   increase in mean glucose of approximately 30 mg/dl.   Reference: Diabetes Care, volume 29, supplement 1 Jan. 2006                        HGB A1C ................. Approx. Mean Glucose   _______________________________________________   6%   ...............................  120 mg/dl   7%   ...............................  150 mg/dl   8%   ...............................  180 mg/dl   9%   ...............................  210 mg/dl   10%  ...............................  240 mg/dl  Performed at [address] [address]     05/11/2022 03:29 AM 5.9 4.0 - 6.0 % Final     Comment:     Hemoglobin A1C levels are related to mean blood glucose during the   preceding 2-3 months. The relationship table below may be used as a   general guide. Each  "1% increase in HGB A1C is a reflection of an   increase in mean glucose of approximately 30 mg/dl.   Reference: Diabetes Care, volume 29, supplement 1 Jan. 2006                        HGB A1C ................. Approx. Mean Glucose   _______________________________________________   6%   ...............................  120 mg/dl   7%   ...............................  150 mg/dl   8%   ...............................  180 mg/dl   9%   ...............................  210 mg/dl   10%  ...............................  240 mg/dl  Performed at [address] [address]       LDLCALC   Date/Time Value Ref Range Status   03/04/2024 09:18 AM 69 65 - 130 mg/dL Final   10/24/2022 08:00 AM 84 65 - 130 MG/DL Final   05/11/2022 03:29 AM 61 65 - 130 MG/DL Final      Last I/O:  No intake/output data recorded.    Past Cardiology Tests (Last 3 Years):  EKG:  ECG 12 lead 04/04/2024 (Preliminary)      ECG 12 lead 04/04/2024 (Preliminary)      ECG 12 lead (Clinic Performed) 12/26/2023    Echo:  No results found for this or any previous visit from the past 1095 days.    Ejection Fractions:  No results found for: \"EF\"  Cath:  No results found for this or any previous visit from the past 1095 days.    Stress Test:  No results found for this or any previous visit from the past 1095 days.    Cardiac Imaging:  No results found for this or any previous visit from the past 1095 days.      Inpatient Medications:  Scheduled medications   Medication Dose Route Frequency    apixaban  5 mg oral BID    hydroCHLOROthiazide  25 mg oral Daily    lisinopril  5 mg oral BID    metoprolol succinate XL  25 mg oral BID    pantoprazole  40 mg oral Daily before breakfast    polyethylene glycol  17 g oral Daily     PRN medications   Medication    acetaminophen    Or    acetaminophen    Or    acetaminophen    metoprolol    ondansetron    Or    ondansetron     Continuous Medications   Medication Dose Last Rate       Physical Exam:  HEENT: " Normocephalic  Neck: No bruits  Cardiovascular exam: Rapid S1 and S2 with variable rates and somewhat irregular 1 out of 6 systolic murmur best heard at the apex  Lungs: Clear to auscultation bilaterally  Abdomen: Soft nontender nondistended  Extremities: Minimal edema     Assessment/Plan   Atrial flutter  The patient has recurrent atrial flutter and is amenable to catheter-based therapy.  He does not wish to stay in the hospital over the weekend which I think is reasonable and he will be discharged today to come back Monday morning for catheter-based therapy.  He understands this and wishes to proceed.  Continue beta-blockers and anticoagulation.  Peripheral IV 04/04/24 20 G Right Antecubital (Active)   Site Assessment Clean;Dry;Intact 04/05/24 0809   Dressing Status Clean;Dry 04/05/24 0809   Number of days: 1       Code Status:  Full Code    I spent 30 minutes in the professional and overall care of this patient.        Maximilian Cortez MD

## 2024-04-05 NOTE — PROGRESS NOTES
"Miguel Cano is a 63 y.o. male on day 0 of admission presenting with Typical atrial flutter (CMS/HCC).    Subjective   Patient upright in bed.  Alert and oriented x 3.  Wife at bedside.  Patient has no complaints of chest pain shortness of breath dizziness lightheaded or heart palpitations.  He remains in atrial flutter.        Objective     Physical Exam  Cardiovascular:      Rate and Rhythm: Normal rate. Rhythm irregular.      Pulses: Normal pulses.      Heart sounds: Normal heart sounds.   Pulmonary:      Effort: Pulmonary effort is normal.      Breath sounds: Normal breath sounds.   Abdominal:      General: Abdomen is flat.      Palpations: Abdomen is soft.   Musculoskeletal:      Right lower leg: No edema.      Left lower leg: No edema.   Neurological:      Mental Status: He is alert and oriented to person, place, and time.         Last Recorded Vitals  Blood pressure 146/85, pulse 98, temperature 36.6 °C (97.9 °F), temperature source Oral, resp. rate 18, height 1.854 m (6' 1\"), weight 136 kg (300 lb), SpO2 99 %.  Intake/Output last 3 Shifts:  No intake/output data recorded.    Relevant Results      Assessment/Plan   Principal Problem:    Typical atrial flutter (CMS/HCC)    Atrial flutter  -Has prior history of a fib and a flutter, follows with Cortez  -Was not on rate control meds but previously had been on amiodarone; had not had an episode of a fib or flutter in 2 years per patient  -On Eliquis  -EP following; likely will restart amiodarone     Elevated troponin  -Initial troponin 86, 75,75  -Suspect 2/2 above but will monitor  -Continuous telemetry  -NPO after midnight in case further testing or procedures in AM     HTN  -BP controlled  -Continue home Lisinopril and hydrochlorothiazide  -Lisinopril dose recently lowered from 20 mg to 5 mg by PCP     CAD   -Hx cardiac catheterization in 05/2022 by Dr. Juan, showed mild nonobstructive coronary artery disease     Overall Impression / " Plan  4/4/2024: Recurrence of a flutter. EP following; likely restart amiodarone. Will monitor telemetry and trend troponins. NPO after midnight in case further testing or procedures warranted.    4/5/2024: Patient reports complete resolution of symptoms. Remains NPO. He is still in atrial flutter. EP is following. Possible ablation today, will discuss with Ep. Otherwise normotensive and no signs of hypervolemia in this patient.          I spent 20 minutes in the professional and overall care of this patient.      Prisca Chandler PA-C

## 2024-04-05 NOTE — CARE PLAN
The clinical goals for the shift include patient will have no chest pain throughout the shift. Patient had a restful and uneventful night, admitted to floor for cardiac workup/ observation. VSS. Patient aflutter rate  throughout the night, asymptomatic with no chest pain. Patient scheduled to have echo today.    Problem: Pain  Goal: My pain/discomfort is manageable  Outcome: Progressing     Problem: Daily Care  Goal: Daily care needs are met  Outcome: Progressing     Problem: Psychosocial Needs  Goal: Demonstrates ability to cope with hospitalization/illness  Outcome: Progressing  Goal: Collaborate with me, my family, and caregiver to identify my specific goals  Outcome: Progressing  Flowsheets (Taken 4/4/2024 1948)  Cultural Requests During Hospitalization: none  Spiritual Requests During Hospitalization: none     Problem: Discharge Barriers  Goal: My discharge needs are met  Outcome: Progressing

## 2024-04-05 NOTE — PROGRESS NOTES
04/05/24 1112   Physical Activity   On average, how many days per week do you engage in moderate to strenuous exercise (like a brisk walk)? 2 days   On average, how many minutes do you engage in exercise at this level? 60 min   Food Insecurity   Within the past 12 months, you worried that your food would run out before you got the money to buy more. Never true   Within the past 12 months, the food you bought just didn't last and you didn't have money to get more. Never true   Stress   Do you feel stress - tense, restless, nervous, or anxious, or unable to sleep at night because your mind is troubled all the time - these days? Not at all   Social Connections   In a typical week, how many times do you talk on the phone with family, friends, or neighbors? More than 3   How often do you get together with friends or relatives? More than 3   How often do you attend Baptism or Anglican services? Never   Do you belong to any clubs or organizations such as Baptism groups, unions, fraternal or athletic groups, or school groups? Yes   How often do you attend meetings of the clubs or organizations you belong to? More than 4   Are you , , , , never , or living with a partner?    Intimate Partner Violence   Within the last year, have you been afraid of your partner or ex-partner? No   Within the last year, have you been humiliated or emotionally abused in other ways by your partner or ex-partner? No   Within the last year, have you been kicked, hit, slapped, or otherwise physically hurt by your partner or ex-partner? No   Within the last year, have you been raped or forced to have any kind of sexual activity by your partner or ex-partner? No   Alcohol Use   Q1: How often do you have a drink containing alcohol? Monthly or l   Q2: How many drinks containing alcohol do you have on a typical day when you are drinking? 1 or 2   Q3: How often do you have six or more drinks on one occasion?  Never   Utilities   In the past 12 months has the electric, gas, oil, or water company threatened to shut off services in your home? No     Met with spouse at bedside .  Patient off floor for echo.  Questions answered by spouse. Patient resides with spouse in a two story home.  Is independent with all ADL's.  Still drives.  Patient  belongs to a hiking club and hikes weekly.  Also rides a bicycle frequently. Patient does not require oxygen or cpap.  POA is spouse.  A copy of the paperwork was requested for hospital records.

## 2024-04-05 NOTE — CARE PLAN
Problem: Pain  Goal: My pain/discomfort is manageable  Outcome: Progressing     Problem: Daily Care  Goal: Daily care needs are met  Outcome: Progressing

## 2024-04-05 NOTE — NURSING NOTE
1402 NATALY Cope is aware patient has an uncontrolled HR ranging from the 120's-146 per minute. Per Prisca patient is okay to discharge. Patient will be transporting himself home by car.

## 2024-04-05 NOTE — DISCHARGE SUMMARY
Discharge Diagnosis  Typical atrial flutter (CMS/HCC)    Issues Requiring Follow-Up  Follow up with EP concerning possible outpatient ablation if warranted    Test Results Pending At Discharge  Pending Labs       No current pending labs.            Hospital Course   HPI during admission:  Miguel Cano is a 63 y.o. male presenting with complaints of R sided upper abdominal cramping, mild headache, lightheadedness, and shortness of breath. He reports yesterday he was working on his car and the RUQ cramping started. This morning the cramping continued, and he started feeling short of breath, lightheaded, and developed a mild headache. He reports his symptoms are worse with activity. He states that this is how he felt the last time he developed a flutter, which was 2 years ago. He denies any recent illness, chest pain, GI upset, fevers, chills. His wife is at the bedside.     ED Course  ECG with a flutter with 2-1 conduction  Given 5 mg IV metoprolol with slight decrease in rate, but still in a flutter with variable conduction  HScTn: 86  CMP and CBC unremarkable    Hospital Course:   You were seen by Cardiology and Cardiology EP and have been cleared for discharge. You were ordered a nuclear stress which came back nonischemic, negative with an ef  of 50-55%. You were started on metoprolol succinate XL 25 mg PO twice a day for rate control for your atrial flutter. Please follow up with your PCP, Cardiology, and Cardiology EP upon hospital discharge.  Above discussed with Attending.    Pertinent Physical Exam At Time of Discharge  Physical Exam  deferred  Home Medications     Medication List      START taking these medications     metoprolol succinate XL 25 mg 24 hr tablet; Commonly known as:   Toprol-XL; Take 1 tablet (25 mg) by mouth 2 times a day. Do not crush or   chew.     CONTINUE taking these medications     Eliquis 5 mg tablet; Generic drug: apixaban; Take 1 tablet (5 mg) by   mouth 2 times a day.    hydroCHLOROthiazide 25 mg tablet; Commonly known as: HYDRODiuril   lisinopril 5 mg tablet; Take 1 tablet (5 mg) by mouth 2 times a day.       Outpatient Follow-Up  No future appointments.    Savanah Dixon, KEANU-CNP

## 2024-04-08 ENCOUNTER — DOCUMENTATION (OUTPATIENT)
Dept: PRIMARY CARE | Facility: CLINIC | Age: 64
End: 2024-04-08
Payer: OTHER GOVERNMENT

## 2024-04-08 ENCOUNTER — HOSPITAL ENCOUNTER (OUTPATIENT)
Facility: HOSPITAL | Age: 64
Discharge: HOME | End: 2024-04-08
Payer: OTHER GOVERNMENT

## 2024-04-08 ENCOUNTER — HOSPITAL ENCOUNTER (OUTPATIENT)
Facility: HOSPITAL | Age: 64
Setting detail: OUTPATIENT SURGERY
Discharge: HOME | End: 2024-04-08
Attending: INTERNAL MEDICINE | Admitting: INTERNAL MEDICINE
Payer: OTHER GOVERNMENT

## 2024-04-08 ENCOUNTER — PREP FOR PROCEDURE (OUTPATIENT)
Dept: CARDIOLOGY | Facility: CLINIC | Age: 64
End: 2024-04-08
Payer: OTHER GOVERNMENT

## 2024-04-08 ENCOUNTER — HOSPITAL ENCOUNTER (INPATIENT)
Facility: HOSPITAL | Age: 64
End: 2024-04-08
Attending: INTERNAL MEDICINE | Admitting: INTERNAL MEDICINE

## 2024-04-08 VITALS
SYSTOLIC BLOOD PRESSURE: 162 MMHG | OXYGEN SATURATION: 93 % | TEMPERATURE: 98.2 F | HEART RATE: 55 BPM | RESPIRATION RATE: 16 BRPM | DIASTOLIC BLOOD PRESSURE: 101 MMHG

## 2024-04-08 DIAGNOSIS — I48.3 TYPICAL ATRIAL FLUTTER (MULTI): ICD-10-CM

## 2024-04-08 DIAGNOSIS — I48.3 TYPICAL ATRIAL FLUTTER (MULTI): Primary | ICD-10-CM

## 2024-04-08 PROCEDURE — 2500000004 HC RX 250 GENERAL PHARMACY W/ HCPCS (ALT 636 FOR OP/ED): Performed by: INTERNAL MEDICINE

## 2024-04-08 PROCEDURE — 7100000009 HC PHASE TWO TIME - INITIAL BASE CHARGE: Performed by: INTERNAL MEDICINE

## 2024-04-08 PROCEDURE — 2720000007 HC OR 272 NO HCPCS: Performed by: INTERNAL MEDICINE

## 2024-04-08 PROCEDURE — 99152 MOD SED SAME PHYS/QHP 5/>YRS: CPT | Performed by: INTERNAL MEDICINE

## 2024-04-08 PROCEDURE — C2630 CATH, EP, COOL-TIP: HCPCS | Performed by: INTERNAL MEDICINE

## 2024-04-08 PROCEDURE — C1731 CATH, EP, 20 OR MORE ELEC: HCPCS | Performed by: INTERNAL MEDICINE

## 2024-04-08 PROCEDURE — 93653 COMPRE EP EVAL TX SVT: CPT | Performed by: INTERNAL MEDICINE

## 2024-04-08 PROCEDURE — 99153 MOD SED SAME PHYS/QHP EA: CPT | Performed by: INTERNAL MEDICINE

## 2024-04-08 PROCEDURE — C1730 CATH, EP, 19 OR FEW ELECT: HCPCS | Performed by: INTERNAL MEDICINE

## 2024-04-08 PROCEDURE — C1760 CLOSURE DEV, VASC: HCPCS | Performed by: INTERNAL MEDICINE

## 2024-04-08 PROCEDURE — 7100000010 HC PHASE TWO TIME - EACH INCREMENTAL 1 MINUTE: Performed by: INTERNAL MEDICINE

## 2024-04-08 PROCEDURE — 2780000003 HC OR 278 NO HCPCS: Performed by: INTERNAL MEDICINE

## 2024-04-08 PROCEDURE — 2500000005 HC RX 250 GENERAL PHARMACY W/O HCPCS: Performed by: INTERNAL MEDICINE

## 2024-04-08 RX ORDER — BUPIVACAINE HYDROCHLORIDE 2.5 MG/ML
INJECTION, SOLUTION EPIDURAL; INFILTRATION; INTRACAUDAL AS NEEDED
Status: DISCONTINUED | OUTPATIENT
Start: 2024-04-08 | End: 2024-04-08 | Stop reason: HOSPADM

## 2024-04-08 RX ORDER — MIDAZOLAM HYDROCHLORIDE 1 MG/ML
INJECTION, SOLUTION INTRAMUSCULAR; INTRAVENOUS AS NEEDED
Status: DISCONTINUED | OUTPATIENT
Start: 2024-04-08 | End: 2024-04-08 | Stop reason: HOSPADM

## 2024-04-08 RX ORDER — FENTANYL CITRATE 50 UG/ML
INJECTION, SOLUTION INTRAMUSCULAR; INTRAVENOUS AS NEEDED
Status: DISCONTINUED | OUTPATIENT
Start: 2024-04-08 | End: 2024-04-08 | Stop reason: HOSPADM

## 2024-04-08 ASSESSMENT — PAIN SCALES - GENERAL: PAINLEVEL_OUTOF10: 0 - NO PAIN

## 2024-04-08 NOTE — POST-PROCEDURE NOTE
Physician Transition of Care Summary  Invasive Cardiovascular Lab    Procedure Date: 4/8/2024  Attending:    * Maximilian Cortez - Primary  Resident/Fellow/Other Assistant: Surgeon(s) and Role:    Indications:   Pre-op Diagnosis     * Typical atrial flutter (CMS/HCC) [I48.3]    Post-procedure diagnosis:   Post-op Diagnosis     * Typical atrial flutter (CMS/HCC) [I48.3]    Procedure(s):     * Ablation Atrial Flutter      Procedure Findings:   See below    Description of the Procedure:   After written witnessed informed consent was obtained the procedure from the patient, the patient was transferred to the EP laboratory. The patient was in the fasting state. A grounding pad was placed. The patient was set up for monitoring of surface ECG and intracardiac electrograms. The right and left groins were prepped and draped in the usual sterile fashion. Bupivacaine was administered locally for anesthetic. Right femoral vein access was obtained. The vessel was accessed using the modified Seldinger technique.     The catheters were positioned as follows:  Right femoral vein  7 Fr  High RA Duodeca Super Large Curl  Right femoral vein 6 Fr coronary sinus EPXT octopolar catheter  Right femoral vein  7 Fr Flexibility SJM open irrigation catheter      Arrhythmias induced and treatment:  The patient was brought to the EP laboratory while in sinus rhythm.  There was evidence of a leak and previous line of ablation.  Attempts to induce sustained atrial flutter or not successful, however the patient did have spontaneous episodes of atrial flutter that appeared typical in nature.  Linear ablation was applied to the right cavo tricuspid isthmus utilizing  3 D mapping with the [employer]  SJM system and areas of leak with subsequent bidirectional conduction block was achieved with change of activation sequence and timing of  170 ms. Conduction block across the CTI was confirmed 30 minutes post ablation.  Vascade closure device was used to  seal all venous access sites    Summary:  Successful radiofrequency ablation of typical atrial flutter.     Complications:   none    Stents/Implants:       Anticoagulation/Antiplatelet Plan:   Eliquis    Estimated Blood Loss:   25 mL    Anesthesia: Moderate Sedation Anesthesia Staff: No anesthesia staff entered.    Any Specimen(s) Removed:   No specimens collected during this procedure.    Disposition:   Stable to home      Electronically signed by: Maximilian Cortez MD, 4/8/2024 10:06 AM   Private car

## 2024-04-09 ASSESSMENT — PAIN SCALES - GENERAL: PAINLEVEL_OUTOF10: 0 - NO PAIN

## 2024-04-19 ENCOUNTER — PHARMACY VISIT (OUTPATIENT)
Dept: PHARMACY | Facility: CLINIC | Age: 64
End: 2024-04-19
Payer: COMMERCIAL

## 2024-04-19 PROCEDURE — RXMED WILLOW AMBULATORY MEDICATION CHARGE

## 2024-06-03 ENCOUNTER — PHARMACY VISIT (OUTPATIENT)
Dept: PHARMACY | Facility: CLINIC | Age: 64
End: 2024-06-03
Payer: COMMERCIAL

## 2024-06-03 DIAGNOSIS — I48.0 AF (PAROXYSMAL ATRIAL FIBRILLATION) (MULTI): ICD-10-CM

## 2024-06-03 DIAGNOSIS — I10 PRIMARY HYPERTENSION: Primary | ICD-10-CM

## 2024-06-03 DIAGNOSIS — I10 BENIGN ESSENTIAL HYPERTENSION: ICD-10-CM

## 2024-06-03 PROCEDURE — RXMED WILLOW AMBULATORY MEDICATION CHARGE

## 2024-06-03 RX ORDER — LISINOPRIL 5 MG/1
5 TABLET ORAL DAILY
Qty: 30 TABLET | Refills: 3 | Status: SHIPPED | OUTPATIENT
Start: 2024-06-03 | End: 2024-06-06 | Stop reason: SDUPTHER

## 2024-06-06 ENCOUNTER — OFFICE VISIT (OUTPATIENT)
Dept: CARDIOLOGY | Facility: CLINIC | Age: 64
End: 2024-06-06
Payer: OTHER GOVERNMENT

## 2024-06-06 VITALS — DIASTOLIC BLOOD PRESSURE: 78 MMHG | HEART RATE: 68 BPM | SYSTOLIC BLOOD PRESSURE: 132 MMHG

## 2024-06-06 DIAGNOSIS — I48.0 PAROXYSMAL ATRIAL FIBRILLATION (MULTI): Primary | ICD-10-CM

## 2024-06-06 DIAGNOSIS — I10 BENIGN ESSENTIAL HYPERTENSION: ICD-10-CM

## 2024-06-06 PROCEDURE — 99214 OFFICE O/P EST MOD 30 MIN: CPT | Performed by: INTERNAL MEDICINE

## 2024-06-06 PROCEDURE — 3075F SYST BP GE 130 - 139MM HG: CPT | Performed by: INTERNAL MEDICINE

## 2024-06-06 PROCEDURE — 93005 ELECTROCARDIOGRAM TRACING: CPT | Performed by: INTERNAL MEDICINE

## 2024-06-06 PROCEDURE — 1036F TOBACCO NON-USER: CPT | Performed by: INTERNAL MEDICINE

## 2024-06-06 PROCEDURE — 93010 ELECTROCARDIOGRAM REPORT: CPT | Performed by: INTERNAL MEDICINE

## 2024-06-06 PROCEDURE — 3078F DIAST BP <80 MM HG: CPT | Performed by: INTERNAL MEDICINE

## 2024-06-06 RX ORDER — HYDROCHLOROTHIAZIDE 25 MG/1
25 TABLET ORAL DAILY
Qty: 90 TABLET | Refills: 3 | Status: SHIPPED | OUTPATIENT
Start: 2024-06-06

## 2024-06-06 RX ORDER — LISINOPRIL 10 MG/1
10 TABLET ORAL DAILY
Qty: 90 TABLET | Refills: 3 | Status: SHIPPED | OUTPATIENT
Start: 2024-06-06

## 2024-06-06 ASSESSMENT — PATIENT HEALTH QUESTIONNAIRE - PHQ9
2. FEELING DOWN, DEPRESSED OR HOPELESS: NOT AT ALL
1. LITTLE INTEREST OR PLEASURE IN DOING THINGS: NOT AT ALL
SUM OF ALL RESPONSES TO PHQ9 QUESTIONS 1 AND 2: 0

## 2024-06-06 ASSESSMENT — COLUMBIA-SUICIDE SEVERITY RATING SCALE - C-SSRS
1. IN THE PAST MONTH, HAVE YOU WISHED YOU WERE DEAD OR WISHED YOU COULD GO TO SLEEP AND NOT WAKE UP?: NO
6. HAVE YOU EVER DONE ANYTHING, STARTED TO DO ANYTHING, OR PREPARED TO DO ANYTHING TO END YOUR LIFE?: NO
2. HAVE YOU ACTUALLY HAD ANY THOUGHTS OF KILLING YOURSELF?: NO

## 2024-06-06 NOTE — PROGRESS NOTES
Chief Complaint   Patient presents with   • Atrial Fibrillation     S/P ablation 4/8/24            The patient is well-known to me.  He is a 63-year-old male with a history of atrial fibrillation in the past post catheter-based therapy about 5 years ago.  More recently he developed atrial flutter and underwent catheter-based therapy a couple months ago and has done quite well with this.  He has not had any recurrent arrhythmias and his groin access sites have healed well.  His blood pressure has been on the higher side with some changes in his medical regimen but otherwise he is well.  He is compliant with his Eliquis.    Atrial Fibrillation  Past medical history includes atrial fibrillation.        Active Ambulatory Problems     Diagnosis Date Noted   • Paroxysmal atrial fibrillation (Multi) 12/26/2023   • SVT (supraventricular tachycardia) (CMS-[facility]) 03/04/2024   • Pure hypercholesterolemia 03/04/2024   • Primary osteoarthritis of left knee 02/06/2022   • Palpitations 03/04/2024   • Obstructive sleep apnea syndrome 03/04/2024   • Hypertrophy of both inferior nasal turbinates 03/04/2024   • Hypertensive heart disease without heart failure 03/04/2024   • History of total knee replacement 06/18/2018   • Conduction disorder of the heart 03/04/2024   • Dilated cardiomyopathy (Multi) 03/04/2024   • Benign essential hypertension 01/13/2022   • Atherosclerotic heart disease of native coronary artery without angina pectoris 03/04/2024   • Annual physical exam 03/04/2024   • Routine medical exam 03/04/2024   • Prostate cancer screening 03/04/2024   • Other fatigue 03/04/2024   • Typical atrial flutter (Multi) 04/04/2024     Resolved Ambulatory Problems     Diagnosis Date Noted   • Stiffness of left knee 03/01/2022   • Shortness of breath 03/04/2024     Past Medical History:   Diagnosis Date   • Atrial fibrillation (Multi)    • Hypertension    • Personal history of other diseases of the circulatory system         Review of  Systems   All other systems reviewed and are negative.       Objective     Vitals:    06/06/24 0950   BP: 132/78   Pulse: 68        Vitals and nursing note reviewed.   Constitutional:       Appearance: Healthy appearance. Obese.   HENT:    Mouth/Throat:      Pharynx: Oropharynx is clear.   Pulmonary:      Effort: Pulmonary effort is normal.      Breath sounds: Normal breath sounds.   Cardiovascular:      PMI at left midclavicular line. Normal rate. Regular rhythm. Normal S1. Normal S2.       Murmurs: There is a grade 2to 1/6 holosystolic murmur.      No gallop.  No click. No rub.   Pulses:     Intact distal pulses.   Edema:     Peripheral edema absent.   Abdominal:      General: Bowel sounds are normal.   Musculoskeletal:      Cervical back: Normal range of motion. Skin:     General: Skin is warm and dry.   Neurological:      General: No focal deficit present.      Mental Status: Alert and oriented to person, place and time.          Lab Review:   No visits with results within 2 Month(s) from this visit.   Latest known visit with results is:   Admission on 04/04/2024, Discharged on 04/05/2024   Component Date Value   • Ventricular Rate 04/04/2024 119    • Atrial Rate 04/04/2024 238    • QRS Duration 04/04/2024 162    • QT Interval 04/04/2024 330    • QTC Calculation(Bazett) 04/04/2024 464    • P Axis 04/04/2024 259    • R Axis 04/04/2024 -72    • T Axis 04/04/2024 27    • QRS Count 04/04/2024 19    • Q Onset 04/04/2024 211    • P Onset 04/04/2024 135    • P Offset 04/04/2024 208    • T Offset 04/04/2024 376    • QTC Fredericia 04/04/2024 414    • WBC 04/04/2024 7.4    • nRBC 04/04/2024 0.0    • RBC 04/04/2024 5.47    • Hemoglobin 04/04/2024 15.9    • Hematocrit 04/04/2024 48.5    • MCV 04/04/2024 89    • MCH 04/04/2024 29.1    • MCHC 04/04/2024 32.8    • RDW 04/04/2024 13.3    • Platelets 04/04/2024 245    • Neutrophils % 04/04/2024 56.3    • Immature Granulocytes %,* 04/04/2024 0.4    • Lymphocytes % 04/04/2024  32.9    • Monocytes % 04/04/2024 8.5    • Eosinophils % 04/04/2024 1.5    • Basophils % 04/04/2024 0.4    • Neutrophils Absolute 04/04/2024 4.15    • Immature Granulocytes Ab* 04/04/2024 0.03    • Lymphocytes Absolute 04/04/2024 2.43    • Monocytes Absolute 04/04/2024 0.63    • Eosinophils Absolute 04/04/2024 0.11    • Basophils Absolute 04/04/2024 0.03    • Glucose 04/04/2024 109 (H)    • Sodium 04/04/2024 136    • Potassium 04/04/2024 3.8    • Chloride 04/04/2024 99    • Bicarbonate 04/04/2024 25    • Urea Nitrogen 04/04/2024 14    • Creatinine 04/04/2024 0.90    • eGFR 04/04/2024 >90    • Calcium 04/04/2024 8.7    • Albumin 04/04/2024 4.0    • Alkaline Phosphatase 04/04/2024 98    • Total Protein 04/04/2024 6.9    • AST 04/04/2024 23    • Bilirubin, Total 04/04/2024 1.0    • ALT 04/04/2024 23    • Anion Gap 04/04/2024 12    • Magnesium 04/04/2024 1.90    • Ventricular Rate 04/04/2024 96    • Atrial Rate 04/04/2024 241    • QRS Duration 04/04/2024 132    • QT Interval 04/04/2024 422    • QTC Calculation(Bazett) 04/04/2024 533    • P Axis 04/04/2024 61    • R Axis 04/04/2024 -50    • T Axis 04/04/2024 -4    • QRS Count 04/04/2024 15    • Q Onset 04/04/2024 211    • P Onset 04/04/2024 139    • P Offset 04/04/2024 199    • T Offset 04/04/2024 422    • QTC Fredericia 04/04/2024 493    • Troponin T, High Sensiti* 04/04/2024 86 ()    • Troponin T, High Sensiti* 04/04/2024 75 ()    • Troponin T, High Sensiti* 04/04/2024 75 ()    • Thyroid Stimulating Horm* 04/04/2024 1.42    • AV pk minerva 04/05/2024 1.17    • LVOT diam 04/05/2024 2.14    • AV mn grad 04/05/2024 2.7    • LV Biplane EF 04/05/2024 35    • LA vol index A/L 04/05/2024 24.1    • LVIDd 04/05/2024 6.08    • AV pk grad 04/05/2024 5.5    • Aortic Valve Area by Con* 04/05/2024 1.87    • Aortic Valve Area by Con* 04/05/2024 1.84    • LV A4C EF 04/05/2024 18.9    • PROBNP 04/04/2024 1,557 (H)    • WBC 04/05/2024 7.7    • nRBC 04/05/2024 0.0    • RBC 04/05/2024  5.58    • Hemoglobin 04/05/2024 16.2    • Hematocrit 04/05/2024 48.8    • MCV 04/05/2024 88    • MCH 04/05/2024 29.0    • MCHC 04/05/2024 33.2    • RDW 04/05/2024 13.0    • Platelets 04/05/2024 228    • Glucose 04/05/2024 103 (H)    • Sodium 04/05/2024 138    • Potassium 04/05/2024 4.0    • Chloride 04/05/2024 102    • Bicarbonate 04/05/2024 26    • Urea Nitrogen 04/05/2024 14    • Creatinine 04/05/2024 0.90    • eGFR 04/05/2024 >90    • Calcium 04/05/2024 8.8    • Albumin 04/05/2024 3.8    • Alkaline Phosphatase 04/05/2024 89    • Total Protein 04/05/2024 6.3    • AST 04/05/2024 17    • Bilirubin, Total 04/05/2024 0.7    • ALT 04/05/2024 20    • Anion Gap 04/05/2024 10        ECG:  Normal sinus rhythm with occasional blocked PACs heart rate 70 bpm WV interval 164 ms QRS duration 90 ms QTc 440 ms    Assessment/plan:  History as above.  Continue current regimen of Eliquis.  I will continue to release 4 months and I advised him to obtain a [employer] mobile device to follow his rhythm.  His [id number] score is fairly low but he does have a history of atrial fibrillation of course there is a propensity to have recurrent atrial fibrillation.  I would also increase his lisinopril to 10 mg a day for his hypertension    Problem List Items Addressed This Visit       Paroxysmal atrial fibrillation (Multi) - Primary    Relevant Orders    ECG 12 lead (Clinic Performed)

## 2024-06-06 NOTE — ASSESSMENT & PLAN NOTE
History as above.  Continue current regimen of Eliquis.  I will continue to release 4 months and I advised him to obtain a [employer] mobile device to follow his rhythm.  His [id number] score is fairly low but he does have a history of atrial fibrillation of course there is a propensity to have recurrent atrial fibrillation.  I would also increase his lisinopril to 10 mg a day for his hypertension

## 2024-09-26 ENCOUNTER — APPOINTMENT (OUTPATIENT)
Dept: RADIOLOGY | Facility: HOSPITAL | Age: 64
End: 2024-09-26
Payer: OTHER GOVERNMENT

## 2024-09-26 ENCOUNTER — APPOINTMENT (OUTPATIENT)
Dept: CARDIOLOGY | Facility: HOSPITAL | Age: 64
End: 2024-09-26
Payer: OTHER GOVERNMENT

## 2024-09-26 ENCOUNTER — PHARMACY VISIT (OUTPATIENT)
Dept: PHARMACY | Facility: CLINIC | Age: 64
End: 2024-09-26
Payer: COMMERCIAL

## 2024-09-26 ENCOUNTER — HOSPITAL ENCOUNTER (EMERGENCY)
Facility: HOSPITAL | Age: 64
Discharge: HOME | End: 2024-09-26
Attending: EMERGENCY MEDICINE
Payer: OTHER GOVERNMENT

## 2024-09-26 VITALS
SYSTOLIC BLOOD PRESSURE: 113 MMHG | OXYGEN SATURATION: 94 % | BODY MASS INDEX: 37.77 KG/M2 | TEMPERATURE: 96.8 F | HEIGHT: 73 IN | DIASTOLIC BLOOD PRESSURE: 92 MMHG | WEIGHT: 285 LBS | HEART RATE: 114 BPM | RESPIRATION RATE: 12 BRPM

## 2024-09-26 DIAGNOSIS — R00.0 TACHYCARDIA: ICD-10-CM

## 2024-09-26 DIAGNOSIS — R53.83 FATIGUE, UNSPECIFIED TYPE: ICD-10-CM

## 2024-09-26 DIAGNOSIS — R53.1 WEAKNESS: Primary | ICD-10-CM

## 2024-09-26 LAB
ALBUMIN SERPL BCP-MCNC: 4 G/DL (ref 3.4–5)
ALP SERPL-CCNC: 79 U/L (ref 33–136)
ALT SERPL W P-5'-P-CCNC: 17 U/L (ref 10–52)
ANION GAP SERPL CALCULATED.3IONS-SCNC: 11 MMOL/L (ref 10–20)
AST SERPL W P-5'-P-CCNC: 19 U/L (ref 9–39)
ATRIAL RATE: 230 BPM
BASOPHILS # BLD AUTO: 0.04 X10*3/UL (ref 0–0.1)
BASOPHILS NFR BLD AUTO: 0.5 %
BILIRUB SERPL-MCNC: 0.7 MG/DL (ref 0–1.2)
BNP SERPL-MCNC: 279 PG/ML (ref 0–99)
BUN SERPL-MCNC: 15 MG/DL (ref 6–23)
CALCIUM SERPL-MCNC: 9 MG/DL (ref 8.6–10.3)
CARDIAC TROPONIN I PNL SERPL HS: 9 NG/L (ref 0–20)
CARDIAC TROPONIN I PNL SERPL HS: 9 NG/L (ref 0–20)
CHLORIDE SERPL-SCNC: 101 MMOL/L (ref 98–107)
CO2 SERPL-SCNC: 27 MMOL/L (ref 21–32)
CREAT SERPL-MCNC: 0.87 MG/DL (ref 0.5–1.3)
EGFRCR SERPLBLD CKD-EPI 2021: >90 ML/MIN/1.73M*2
EOSINOPHIL # BLD AUTO: 0.11 X10*3/UL (ref 0–0.7)
EOSINOPHIL NFR BLD AUTO: 1.3 %
ERYTHROCYTE [DISTWIDTH] IN BLOOD BY AUTOMATED COUNT: 13.2 % (ref 11.5–14.5)
FLUAV RNA RESP QL NAA+PROBE: NOT DETECTED
FLUBV RNA RESP QL NAA+PROBE: NOT DETECTED
GLUCOSE SERPL-MCNC: 100 MG/DL (ref 74–99)
HCT VFR BLD AUTO: 50.1 % (ref 41–52)
HGB BLD-MCNC: 16.8 G/DL (ref 13.5–17.5)
IMM GRANULOCYTES # BLD AUTO: 0.02 X10*3/UL (ref 0–0.7)
IMM GRANULOCYTES NFR BLD AUTO: 0.2 % (ref 0–0.9)
LYMPHOCYTES # BLD AUTO: 2.01 X10*3/UL (ref 1.2–4.8)
LYMPHOCYTES NFR BLD AUTO: 24.1 %
MAGNESIUM SERPL-MCNC: 1.74 MG/DL (ref 1.6–2.4)
MCH RBC QN AUTO: 29.6 PG (ref 26–34)
MCHC RBC AUTO-ENTMCNC: 33.5 G/DL (ref 32–36)
MCV RBC AUTO: 88 FL (ref 80–100)
MONOCYTES # BLD AUTO: 0.81 X10*3/UL (ref 0.1–1)
MONOCYTES NFR BLD AUTO: 9.7 %
NEUTROPHILS # BLD AUTO: 5.36 X10*3/UL (ref 1.2–7.7)
NEUTROPHILS NFR BLD AUTO: 64.2 %
NRBC BLD-RTO: 0 /100 WBCS (ref 0–0)
P AXIS: 208 DEGREES
P OFFSET: 174 MS
P ONSET: 102 MS
PLATELET # BLD AUTO: 235 X10*3/UL (ref 150–450)
POTASSIUM SERPL-SCNC: 4.1 MMOL/L (ref 3.5–5.3)
PROT SERPL-MCNC: 6.8 G/DL (ref 6.4–8.2)
Q ONSET: 210 MS
QRS COUNT: 18 BEATS
QRS DURATION: 132 MS
QT INTERVAL: 296 MS
QTC CALCULATION(BAZETT): 409 MS
QTC FREDERICIA: 367 MS
R AXIS: -52 DEGREES
RBC # BLD AUTO: 5.68 X10*6/UL (ref 4.5–5.9)
SARS-COV-2 RNA RESP QL NAA+PROBE: NOT DETECTED
SODIUM SERPL-SCNC: 135 MMOL/L (ref 136–145)
T AXIS: 8 DEGREES
T OFFSET: 358 MS
VENTRICULAR RATE: 115 BPM
WBC # BLD AUTO: 8.4 X10*3/UL (ref 4.4–11.3)

## 2024-09-26 PROCEDURE — 84484 ASSAY OF TROPONIN QUANT: CPT

## 2024-09-26 PROCEDURE — 85025 COMPLETE CBC W/AUTO DIFF WBC: CPT

## 2024-09-26 PROCEDURE — 83735 ASSAY OF MAGNESIUM: CPT

## 2024-09-26 PROCEDURE — 87636 SARSCOV2 & INF A&B AMP PRB: CPT

## 2024-09-26 PROCEDURE — 36415 COLL VENOUS BLD VENIPUNCTURE: CPT

## 2024-09-26 PROCEDURE — 83880 ASSAY OF NATRIURETIC PEPTIDE: CPT

## 2024-09-26 PROCEDURE — RXMED WILLOW AMBULATORY MEDICATION CHARGE

## 2024-09-26 PROCEDURE — 71045 X-RAY EXAM CHEST 1 VIEW: CPT

## 2024-09-26 PROCEDURE — 93005 ELECTROCARDIOGRAM TRACING: CPT

## 2024-09-26 PROCEDURE — 99283 EMERGENCY DEPT VISIT LOW MDM: CPT | Mod: 25

## 2024-09-26 PROCEDURE — 71045 X-RAY EXAM CHEST 1 VIEW: CPT | Performed by: RADIOLOGY

## 2024-09-26 PROCEDURE — 84520 ASSAY OF UREA NITROGEN: CPT

## 2024-09-26 RX ORDER — METOPROLOL TARTRATE 50 MG/1
25 TABLET ORAL 2 TIMES DAILY
Qty: 20 TABLET | Refills: 0 | Status: SHIPPED | OUTPATIENT
Start: 2024-09-26 | End: 2024-10-16

## 2024-09-26 RX ORDER — METOPROLOL TARTRATE 50 MG/1
25 TABLET ORAL 2 TIMES DAILY
Qty: 20 TABLET | Refills: 0 | Status: SHIPPED | OUTPATIENT
Start: 2024-09-26 | End: 2024-09-26

## 2024-09-26 ASSESSMENT — PAIN SCALES - GENERAL: PAINLEVEL_OUTOF10: 1

## 2024-09-26 ASSESSMENT — PAIN - FUNCTIONAL ASSESSMENT: PAIN_FUNCTIONAL_ASSESSMENT: 0-10

## 2024-09-26 NOTE — ED PROVIDER NOTES
HPI   Chief Complaint   Patient presents with    Fatigue     Patient states fatigue that started this morning states history of afib. No other complaints       HPI  Patient is a 63-year-old male who presents to ED for chief complaint of fatigue that started this morning.  Patient reports a history of atrial fibrillation.  He has no other acute complaints at this time.  Patient states he just feels off, he states he knows when he is in atrial fibrillation.  He denies any fever chills, recent illness, cough or congestion, headache or dizziness, chest pain or shortness of breath, abdominal pain or NVD, urinary symptoms.  Denies any recent hospitalizations or surgeries.  Denies any recent travel or sick contacts.      Patient History   Past Medical History:   Diagnosis Date    Atrial fibrillation (Multi)     Hypertension     Personal history of other diseases of the circulatory system     History of hypertension    Shortness of breath 03/04/2024    Stiffness of left knee 03/01/2022     Past Surgical History:   Procedure Laterality Date    CARDIAC CATHETERIZATION      CARDIAC ELECTROPHYSIOLOGY PROCEDURE N/A 4/8/2024    Procedure: Ablation Atrial Flutter;  Surgeon: Maximilian Cortez MD;  Location: [facility] Cardiac Cath Lab;  Service: Electrophysiology;  Laterality: N/A;  no pre auth required    OTHER SURGICAL HISTORY  02/16/2021    Knee replacement    OTHER SURGICAL HISTORY  02/16/2021    Heart surgery     No family history on file.  Social History     Tobacco Use    Smoking status: Never    Smokeless tobacco: Never   Vaping Use    Vaping status: Never Used   Substance Use Topics    Alcohol use: Yes     Alcohol/week: 2.0 standard drinks of alcohol     Types: 2 Standard drinks or equivalent per week     Comment: Socially    Drug use: Never       Physical Exam   ED Triage Vitals [09/26/24 0927]   Temperature Heart Rate Respirations BP   36 °C (96.8 °F) (!) 114 18 (!) 113/92      Pulse Ox Temp Source Heart Rate Source Patient  Position   99 % Temporal Monitor Sitting      BP Location FiO2 (%)     Left arm --       Physical Exam  Vitals reviewed.   Constitutional:       General: He is not in acute distress.     Appearance: Normal appearance. He is not ill-appearing.   HENT:      Head: Normocephalic and atraumatic.   Eyes:      Extraocular Movements: Extraocular movements intact.   Cardiovascular:      Rate and Rhythm: Normal rate and regular rhythm.      Heart sounds: Normal heart sounds.   Pulmonary:      Effort: Pulmonary effort is normal.      Breath sounds: Normal breath sounds.   Abdominal:      General: Abdomen is flat.      Palpations: Abdomen is soft.      Tenderness: There is no abdominal tenderness.   Musculoskeletal:         General: Normal range of motion.      Cervical back: Normal range of motion and neck supple.   Skin:     General: Skin is warm and dry.   Neurological:      General: No focal deficit present.      Mental Status: He is alert and oriented to person, place, and time.   Psychiatric:         Mood and Affect: Mood normal.         Behavior: Behavior normal.           ED Course & MDM   ED Course as of 09/26/24 1815   Thu Sep 26, 2024   1147 EKG personally interpreted by mePerformed at 1024  Atrial flutter with a ventricular rate 115 2-1 conduction with left axis deviation no acute ischemic changes [EF]      ED Course User Index  [EF] Alisha Friend, DO         Diagnoses as of 09/26/24 1815   Weakness   Fatigue, unspecified type   Tachycardia                 No data recorded     Anthony Coma Scale Score: 15 (09/26/24 0928 : Darryl Rudd RN)                           Medical Decision Making  Parts of this chart have been completed using voice recognition software. Please excuse any errors of transcription.  My thought process and reason for plan has been formulated from the time that I saw the patient until the time of disposition and is not specific to one specific moment during their visit and furthermore my MDM  encompasses this entire chart and not only this text box.    HPI:   Detailed above.    Exam:   A medically appropriate exam performed, outlined above, given the known history and presentation.    History obtained from:   Patient    EKG/Cardiac monitor:   Interpreted by attending physician, see their note for ED course for more detail.    Social Determinants of Health considered during this visit:   Housing, Family or social support    Medications given during visit:  Medications - No data to display     Diagnostic/tests:  Labs Reviewed   COMPREHENSIVE METABOLIC PANEL - Abnormal       Result Value    Glucose 100 (*)     Sodium 135 (*)     Potassium 4.1      Chloride 101      Bicarbonate 27      Anion Gap 11      Urea Nitrogen 15      Creatinine 0.87      eGFR >90      Calcium 9.0      Albumin 4.0      Alkaline Phosphatase 79      Total Protein 6.8      AST 19      Bilirubin, Total 0.7      ALT 17     B-TYPE NATRIURETIC PEPTIDE - Abnormal     (*)     Narrative:        <100 pg/mL - Heart failure unlikely  100-299 pg/mL - Intermediate probability of acute heart                  failure exacerbation. Correlate with clinical                  context and patient history.    >=300 pg/mL - Heart Failure likely. Correlate with clinical                  context and patient history.    BNP testing is performed using different testing methodology at [facility] than at other [facility]. Direct result comparisons should only be made within the same method.      MAGNESIUM - Normal    Magnesium 1.74     SARS-COV-2 PCR - Normal    Coronavirus 2019, PCR Not Detected      Narrative:     This assay has received FDA Emergency Use Authorization (EUA) and is only authorized for the duration of time that circumstances exist to justify the authorization of the emergency use of in vitro diagnostic tests for the detection of SARS-CoV-2 virus and/or diagnosis of COVID-19 infection under section 564(b)(1) of the Act,  21 U.S.C. 360bbb-3(b)(1). This assay is an in vitro diagnostic nucleic acid amplification test for the qualitative detection of SARS-CoV-2 from nasopharyngeal specimens and has been validated for use at [facility]. Negative results do not preclude COVID-19 infections and should not be used as the sole basis for diagnosis, treatment, or other management decisions.     INFLUENZA A AND B PCR - Normal    Flu A Result Not Detected      Flu B Result Not Detected      Narrative:     This assay is an in vitro diagnostic multiplex nucleic acid amplification test for the detection and discrimination of Influenza A & B from nasopharyngeal specimens, and has been validated for use at [facility]. Negative results do not preclude Influenza A/B infections, and should not be used as the sole basis for diagnosis, treatment, or other management decisions. If Influenza A/B and RSV PCR results are negative, testing for Parainfluenza virus, Adenovirus and Metapneumovirus is routinely performed for [facility] pediatric oncology and intensive care inpatients, and is available on other patients by placing an add-on request.   SERIAL TROPONIN-INITIAL - Normal    Troponin I, High Sensitivity 9      Narrative:     Less than 99th percentile of normal range cutoff-  Female and children under 18 years old <14 ng/L; Male <21 ng/L: Negative  Repeat testing should be performed if clinically indicated.     Female and children under 18 years old 14-50 ng/L; Male 21-50 ng/L:  Consistent with possible cardiac damage and possible increased clinical   risk. Serial measurements may help to assess extent of myocardial damage.     >50 ng/L: Consistent with cardiac damage, increased clinical risk and  myocardial infarction. Serial measurements may help assess extent of   myocardial damage.      NOTE: Children less than 1 year old may have higher baseline troponin   levels and results should be interpreted in  conjunction with the overall   clinical context.     NOTE: Troponin I testing is performed using a different   testing methodology at [facility] than at other   [facility]. Direct result comparisons should only   be made within the same method.   SERIAL TROPONIN, 1 HOUR - Normal    Troponin I, High Sensitivity 9      Narrative:     Less than 99th percentile of normal range cutoff-  Female and children under 18 years old <14 ng/L; Male <21 ng/L: Negative  Repeat testing should be performed if clinically indicated.     Female and children under 18 years old 14-50 ng/L; Male 21-50 ng/L:  Consistent with possible cardiac damage and possible increased clinical   risk. Serial measurements may help to assess extent of myocardial damage.     >50 ng/L: Consistent with cardiac damage, increased clinical risk and  myocardial infarction. Serial measurements may help assess extent of   myocardial damage.      NOTE: Children less than 1 year old may have higher baseline troponin   levels and results should be interpreted in conjunction with the overall   clinical context.     NOTE: Troponin I testing is performed using a different   testing methodology at [facility] than at other   [facility]. Direct result comparisons should only   be made within the same method.   CBC WITH AUTO DIFFERENTIAL    WBC 8.4      nRBC 0.0      RBC 5.68      Hemoglobin 16.8      Hematocrit 50.1      MCV 88      MCH 29.6      MCHC 33.5      RDW 13.2      Platelets 235      Neutrophils % 64.2      Immature Granulocytes %, Automated 0.2      Lymphocytes % 24.1      Monocytes % 9.7      Eosinophils % 1.3      Basophils % 0.5      Neutrophils Absolute 5.36      Immature Granulocytes Absolute, Automated 0.02      Lymphocytes Absolute 2.01      Monocytes Absolute 0.81      Eosinophils Absolute 0.11      Basophils Absolute 0.04     TROPONIN SERIES- (INITIAL, 1 HR)    Narrative:     The following orders were created for  panel order Troponin I Series, High Sensitivity (0, 1 HR).  Procedure                               Abnormality         Status                     ---------                               -----------         ------                     Troponin I, High Sensiti...[[id number]]  Normal              Final result               Troponin, High Sensitivi...[[id number]]  Normal              Final result                 Please view results for these tests on the individual orders.      XR chest 1 view   Final Result   1. No acute cardiopulmonary process.        MACRO:   None.        Signed by: Sada Flores 9/26/2024 10:53 AM   Dictation workstation:   [id number]             [facility] Summary:  Lab workup was unremarkable today.  EKG does show atrial flutter with rate of 114.  Cardiology was consulted.  Will start patient on metoprolol to tartrate 25 mg twice daily.  Patient will follow-up with cardiology.    We have discussed the diagnosis and risks, and we agree with discharging home to follow-up with appropriate physician as directed. We also discussed returning to the Emergency Department immediately if new or worsening symptoms occur. We have discussed the symptoms which are most concerning that necessitate immediate return. Pt symptoms have been well controlled here and the patient is safe for discharge with appropriate outpatient follow up. The patient has verbalized understanding to return to ER without delay for new or worsening pains or for any other symptoms or concerns. I utilized the discharge clinical management tool provided [employer] to help estimate risk of negative outcome for this patient.        Procedure  Procedures     Miguel Serra PA-C  09/26/24 3561

## 2024-10-08 ENCOUNTER — OFFICE VISIT (OUTPATIENT)
Dept: CARDIOLOGY | Facility: CLINIC | Age: 64
End: 2024-10-08
Payer: OTHER GOVERNMENT

## 2024-10-08 VITALS — DIASTOLIC BLOOD PRESSURE: 76 MMHG | HEART RATE: 58 BPM | SYSTOLIC BLOOD PRESSURE: 118 MMHG

## 2024-10-08 DIAGNOSIS — I48.3 TYPICAL ATRIAL FLUTTER (MULTI): ICD-10-CM

## 2024-10-08 DIAGNOSIS — I48.0 PAROXYSMAL ATRIAL FIBRILLATION (MULTI): Primary | ICD-10-CM

## 2024-10-08 PROCEDURE — 3078F DIAST BP <80 MM HG: CPT | Performed by: INTERNAL MEDICINE

## 2024-10-08 PROCEDURE — 93010 ELECTROCARDIOGRAM REPORT: CPT | Performed by: INTERNAL MEDICINE

## 2024-10-08 PROCEDURE — 93005 ELECTROCARDIOGRAM TRACING: CPT | Performed by: INTERNAL MEDICINE

## 2024-10-08 PROCEDURE — 3074F SYST BP LT 130 MM HG: CPT | Performed by: INTERNAL MEDICINE

## 2024-10-08 PROCEDURE — 99213 OFFICE O/P EST LOW 20 MIN: CPT | Performed by: INTERNAL MEDICINE

## 2024-10-08 ASSESSMENT — PATIENT HEALTH QUESTIONNAIRE - PHQ9
SUM OF ALL RESPONSES TO PHQ9 QUESTIONS 1 AND 2: 0
2. FEELING DOWN, DEPRESSED OR HOPELESS: NOT AT ALL
1. LITTLE INTEREST OR PLEASURE IN DOING THINGS: NOT AT ALL

## 2024-10-08 NOTE — ASSESSMENT & PLAN NOTE
The patient is reluctant to pursue catheter-based therapy and although I suspect he has a small leak in his CTI line of ablation.  I have advised him to avoid alcohol, stay well-hydrated and resume his therapy for his ELMER.  He will call me if he has recurrence and consider catheter-based therapy once again.  Continue beta-blocker and anticoagulation for now.

## 2024-10-08 NOTE — PROGRESS NOTES
Chief Complaint   Patient presents with    Atrial Flutter     At [facility] on 9/26/24 and in a [place] on 9/29/24 with a cardioversion            The patient is known to me from previous encounters.  He is a 64-year-old male with a history of atrial flutter post catheter-based therapy in 2019 and again in April of this year.  Unfortunately he had recurrence once again and presented to the emergency room.  He was sent home and then again presented to the emergency room in [place] while he was on vacation with lightheadedness and fatigue.  They performed a cardioversion and he is seeing me today in very close follow-up as this all occurred just about a week ago.  He notes that his symptoms this time were markedly more pronounced than in the past with lightheadedness and near syncope.  This is despite a ventricular response of around 90 bpm with variable conduction and a fairly slow flutter cycle length of about 300 ms.         Active Ambulatory Problems     Diagnosis Date Noted    Paroxysmal atrial fibrillation (Multi) 12/26/2023    SVT (supraventricular tachycardia) ([facility]) 03/04/2024    Pure hypercholesterolemia 03/04/2024    Primary osteoarthritis of left knee 02/06/2022    Palpitations 03/04/2024    Obstructive sleep apnea syndrome 03/04/2024    Hypertrophy of both inferior nasal turbinates 03/04/2024    Hypertensive heart disease without heart failure 03/04/2024    History of total knee replacement 06/18/2018    Conduction disorder of the heart 03/04/2024    Dilated cardiomyopathy (Multi) 03/04/2024    Benign essential hypertension 01/13/2022    Atherosclerotic heart disease of native coronary artery without angina pectoris 03/04/2024    Annual physical exam 03/04/2024    Routine medical exam 03/04/2024    Prostate cancer screening 03/04/2024    Other fatigue 03/04/2024    Typical atrial flutter (Multi) 04/04/2024     Resolved Ambulatory Problems     Diagnosis Date Noted    Stiffness of left knee 03/01/2022     Shortness of breath 03/04/2024     Past Medical History:   Diagnosis Date    Atrial fibrillation (Multi)     Hypertension     Personal history of other diseases of the circulatory system         Review of Systems   All other systems reviewed and are negative.       Objective     Vitals:    10/08/24 0844   BP: 118/76   Pulse: 58        Vitals and nursing note reviewed.   Constitutional:       Appearance: Healthy appearance. Obese.   HENT:    Mouth/Throat:      Pharynx: Oropharynx is clear.   Pulmonary:      Effort: Pulmonary effort is normal.      Breath sounds: Normal breath sounds.   Cardiovascular:      PMI at left midclavicular line. Normal rate. Regular rhythm. Normal S1. Normal S2.       Murmurs: There is a grade 1/6 holosystolic murmur.      No gallop.  No click. No rub.   Pulses:     Intact distal pulses.   Edema:     Peripheral edema absent.   Abdominal:      General: Bowel sounds are normal.   Musculoskeletal:      Cervical back: Normal range of motion. Skin:     General: Skin is warm and dry.   Neurological:      General: No focal deficit present.      Mental Status: Alert and oriented to person, place and time.            Lab Review:   Admission on 09/26/2024, Discharged on 09/26/2024   Component Date Value    Ventricular Rate 09/26/2024 115     Atrial Rate 09/26/2024 230     QRS Duration 09/26/2024 132     QT Interval 09/26/2024 296     QTC Calculation(Bazett) 09/26/2024 409     P Lincoln 09/26/2024 208     R Lincoln 09/26/2024 -52     T Lincoln 09/26/2024 8     QRS Count 09/26/2024 18     Q Onset 09/26/2024 210     P Onset 09/26/2024 102     P Offset 09/26/2024 174     T Offset 09/26/2024 358     QTC Fredericia 09/26/2024 367     WBC 09/26/2024 8.4     nRBC 09/26/2024 0.0     RBC 09/26/2024 5.68     Hemoglobin 09/26/2024 16.8     Hematocrit 09/26/2024 50.1     MCV 09/26/2024 88     MCH 09/26/2024 29.6     MCHC 09/26/2024 33.5     RDW 09/26/2024 13.2     Platelets 09/26/2024 235     Neutrophils % 09/26/2024  64.2     Immature Granulocytes %,* 09/26/2024 0.2     Lymphocytes % 09/26/2024 24.1     Monocytes % 09/26/2024 9.7     Eosinophils % 09/26/2024 1.3     Basophils % 09/26/2024 0.5     Neutrophils Absolute 09/26/2024 5.36     Immature Granulocytes Ab* 09/26/2024 0.02     Lymphocytes Absolute 09/26/2024 2.01     Monocytes Absolute 09/26/2024 0.81     Eosinophils Absolute 09/26/2024 0.11     Basophils Absolute 09/26/2024 0.04     Glucose 09/26/2024 100 (H)     Sodium 09/26/2024 135 (L)     Potassium 09/26/2024 4.1     Chloride 09/26/2024 101     Bicarbonate 09/26/2024 27     Anion Gap 09/26/2024 11     Urea Nitrogen 09/26/2024 15     Creatinine 09/26/2024 0.87     eGFR 09/26/2024 >90     Calcium 09/26/2024 9.0     Albumin 09/26/2024 4.0     Alkaline Phosphatase 09/26/2024 79     Total Protein 09/26/2024 6.8     AST 09/26/2024 19     Bilirubin, Total 09/26/2024 0.7     ALT 09/26/2024 17     Magnesium 09/26/2024 1.74     BNP 09/26/2024 279 (H)     Coronavirus 2019, PCR 09/26/2024 Not Detected     Flu A Result 09/26/2024 Not Detected     Flu B Result 09/26/2024 Not Detected     Troponin I, High Sensiti* 09/26/2024 9     Troponin I, High Sensiti* 09/26/2024 9        ECG:  Sinus bradycardia at 58 bpm RI interval 170 ms QRS duration 100 ms QTc 440 ms    Assessment/plan:  The patient is reluctant to pursue catheter-based therapy and although I suspect he has a small leak in his CTI line of ablation.  I have advised him to avoid alcohol, stay well-hydrated and resume his therapy for his ELMER.  He will call me if he has recurrence and consider catheter-based therapy once again.  Continue beta-blocker and anticoagulation for now.    Problem List Items Addressed This Visit       Paroxysmal atrial fibrillation (Multi) - Primary    Relevant Orders    ECG 12 lead (Clinic Performed)    Typical atrial flutter (Multi)    Relevant Orders    ECG 12 lead (Clinic Performed)

## 2024-10-31 ENCOUNTER — APPOINTMENT (OUTPATIENT)
Dept: CARDIOLOGY | Facility: CLINIC | Age: 64
End: 2024-10-31
Payer: OTHER GOVERNMENT

## 2024-12-04 ASSESSMENT — ENCOUNTER SYMPTOMS
RHINORRHEA: 0
DIARRHEA: 0
NECK PAIN: 0
SORE THROAT: 0
ABDOMINAL PAIN: 0
VOMITING: 0
HEADACHES: 0

## 2024-12-05 ENCOUNTER — OFFICE VISIT (OUTPATIENT)
Dept: PRIMARY CARE | Facility: CLINIC | Age: 64
End: 2024-12-05
Payer: OTHER GOVERNMENT

## 2024-12-05 VITALS
DIASTOLIC BLOOD PRESSURE: 88 MMHG | BODY MASS INDEX: 38.66 KG/M2 | HEART RATE: 76 BPM | WEIGHT: 293 LBS | TEMPERATURE: 98.2 F | SYSTOLIC BLOOD PRESSURE: 138 MMHG | OXYGEN SATURATION: 97 % | RESPIRATION RATE: 20 BRPM

## 2024-12-05 DIAGNOSIS — Z23 ENCOUNTER FOR ADMINISTRATION OF VACCINE: ICD-10-CM

## 2024-12-05 PROCEDURE — 3075F SYST BP GE 130 - 139MM HG: CPT | Performed by: FAMILY MEDICINE

## 2024-12-05 PROCEDURE — 90471 IMMUNIZATION ADMIN: CPT | Performed by: FAMILY MEDICINE

## 2024-12-05 PROCEDURE — 99213 OFFICE O/P EST LOW 20 MIN: CPT | Performed by: FAMILY MEDICINE

## 2024-12-05 PROCEDURE — 1036F TOBACCO NON-USER: CPT | Performed by: FAMILY MEDICINE

## 2024-12-05 PROCEDURE — 90673 RIV3 VACCINE NO PRESERV IM: CPT | Performed by: FAMILY MEDICINE

## 2024-12-05 PROCEDURE — 3079F DIAST BP 80-89 MM HG: CPT | Performed by: FAMILY MEDICINE

## 2024-12-05 ASSESSMENT — ENCOUNTER SYMPTOMS
NECK PAIN: 0
DEPRESSION: 0
DIARRHEA: 0
ABDOMINAL PAIN: 0
LOSS OF SENSATION IN FEET: 0
SORE THROAT: 0
RHINORRHEA: 0
VOMITING: 0
HEADACHES: 0
OCCASIONAL FEELINGS OF UNSTEADINESS: 0

## 2024-12-05 ASSESSMENT — PAIN SCALES - GENERAL: PAINLEVEL_OUTOF10: 0-NO PAIN

## 2024-12-05 NOTE — PROGRESS NOTES
Subjective   Patient ID: Miguel Cano is a 64 y.o. male who presents for Ear Fullness (Left ear).    Ear Fullness   Pertinent negatives include no abdominal pain, diarrhea, ear discharge, headaches, hearing loss, neck pain, rash, rhinorrhea, sore throat or vomiting.   Earache   There is pain in the left ear. This is a recurrent problem. The current episode started yesterday. The problem has been unchanged. There has been no fever. The pain is at a severity of 0/10. Pertinent negatives include no abdominal pain, diarrhea, ear discharge, headaches, hearing loss, neck pain, rash, rhinorrhea, sore throat or vomiting.        Review of Systems   HENT:  Positive for ear pain. Negative for ear discharge, hearing loss, rhinorrhea and sore throat.    Gastrointestinal:  Negative for abdominal pain, diarrhea and vomiting.   Musculoskeletal:  Negative for neck pain.   Skin:  Negative for rash.   Neurological:  Negative for headaches.       Objective   /88   Pulse 76   Temp 36.8 °C (98.2 °F)   Resp 20   Wt 133 kg (293 lb)   SpO2 97%   BMI 38.66 kg/m²     Physical Exam  Constitutional:       General: He is not in acute distress.     Appearance: Normal appearance.   Cardiovascular:      Rate and Rhythm: Normal rate and regular rhythm.      Heart sounds: No murmur heard.  Pulmonary:      Breath sounds: Normal breath sounds. No wheezing.   Neurological:      Mental Status: He is alert.     Left ear cerumen blocked    Assessment/Plan   Problem List Items Addressed This Visit    None  Visit Diagnoses         Codes    Encounter for administration of vaccine     Z23    Relevant Orders    Flu vaccine, trivalent, preservative free, no egg protein, age 18y+ (Flublok) (Completed)          Flushed successfully

## 2024-12-16 ENCOUNTER — TELEPHONE (OUTPATIENT)
Dept: CARDIOLOGY | Facility: CLINIC | Age: 64
End: 2024-12-16
Payer: OTHER GOVERNMENT

## 2024-12-16 DIAGNOSIS — I10 PRIMARY HYPERTENSION: ICD-10-CM

## 2024-12-16 DIAGNOSIS — I10 BENIGN ESSENTIAL HYPERTENSION: ICD-10-CM

## 2024-12-16 RX ORDER — HYDROCHLOROTHIAZIDE 25 MG/1
25 TABLET ORAL DAILY
Qty: 90 TABLET | Refills: 0 | Status: SHIPPED | OUTPATIENT
Start: 2024-12-16

## 2024-12-18 RX ORDER — LISINOPRIL 10 MG/1
10 TABLET ORAL DAILY
Qty: 90 TABLET | Refills: 3 | Status: SHIPPED | OUTPATIENT
Start: 2024-12-18

## 2025-04-16 DIAGNOSIS — I10 PRIMARY HYPERTENSION: ICD-10-CM

## 2025-04-16 NOTE — TELEPHONE ENCOUNTER
Rx Refill Request- 90 day supply    Pharmacy:    [facility]/pharmacy #[id number] - [place], [place] - [address] AT [address] [facility] Phone: [phone]   Fax: [phone]        Medication:     Dispensed Days Supply Quantity Provider Pharmacy   HYDROCHLOROTHIAZIDE 25 MG TAB 12/16/2024 90 90 each Marcelino Juan DO [facility]/pharmacy #[id number] - P...

## 2025-04-17 ENCOUNTER — APPOINTMENT (OUTPATIENT)
Dept: PRIMARY CARE | Facility: CLINIC | Age: 65
End: 2025-04-17
Payer: OTHER GOVERNMENT

## 2025-04-17 RX ORDER — HYDROCHLOROTHIAZIDE 25 MG/1
25 TABLET ORAL DAILY
Qty: 90 TABLET | Refills: 0 | Status: SHIPPED | OUTPATIENT
Start: 2025-04-17

## 2025-05-21 ENCOUNTER — OFFICE VISIT (OUTPATIENT)
Dept: CARDIOLOGY | Facility: CLINIC | Age: 65
End: 2025-05-21
Payer: OTHER GOVERNMENT

## 2025-05-21 VITALS
BODY MASS INDEX: 39.05 KG/M2 | OXYGEN SATURATION: 97 % | RESPIRATION RATE: 16 BRPM | HEART RATE: 62 BPM | DIASTOLIC BLOOD PRESSURE: 80 MMHG | WEIGHT: 296 LBS | SYSTOLIC BLOOD PRESSURE: 135 MMHG

## 2025-05-21 DIAGNOSIS — I10 BENIGN ESSENTIAL HYPERTENSION: ICD-10-CM

## 2025-05-21 DIAGNOSIS — I10 PRIMARY HYPERTENSION: ICD-10-CM

## 2025-05-21 DIAGNOSIS — R53.83 FATIGUE, UNSPECIFIED TYPE: ICD-10-CM

## 2025-05-21 DIAGNOSIS — R53.1 WEAKNESS: Primary | ICD-10-CM

## 2025-05-21 DIAGNOSIS — I48.3 TYPICAL ATRIAL FLUTTER (MULTI): ICD-10-CM

## 2025-05-21 DIAGNOSIS — I25.10 ATHEROSCLEROSIS OF NATIVE CORONARY ARTERY OF NATIVE HEART WITHOUT ANGINA PECTORIS: ICD-10-CM

## 2025-05-21 PROCEDURE — 1036F TOBACCO NON-USER: CPT | Performed by: INTERNAL MEDICINE

## 2025-05-21 PROCEDURE — 3075F SYST BP GE 130 - 139MM HG: CPT | Performed by: INTERNAL MEDICINE

## 2025-05-21 PROCEDURE — 99214 OFFICE O/P EST MOD 30 MIN: CPT | Performed by: INTERNAL MEDICINE

## 2025-05-21 PROCEDURE — 3079F DIAST BP 80-89 MM HG: CPT | Performed by: INTERNAL MEDICINE

## 2025-05-21 RX ORDER — LISINOPRIL 20 MG/1
20 TABLET ORAL DAILY
Qty: 90 TABLET | Refills: 3 | Status: SHIPPED | OUTPATIENT
Start: 2025-05-21 | End: 2025-05-21 | Stop reason: DRUGHIGH

## 2025-05-21 RX ORDER — METOPROLOL TARTRATE 25 MG/1
25 TABLET, FILM COATED ORAL 2 TIMES DAILY
Qty: 90 TABLET | Refills: 3 | Status: SHIPPED | OUTPATIENT
Start: 2025-05-21 | End: 2026-05-16

## 2025-05-21 RX ORDER — LISINOPRIL 20 MG/1
10 TABLET ORAL DAILY
Qty: 180 TABLET | Refills: 3 | Status: SHIPPED | OUTPATIENT
Start: 2025-05-21 | End: 2025-05-21 | Stop reason: DRUGHIGH

## 2025-05-21 RX ORDER — LISINOPRIL 10 MG/1
10 TABLET ORAL 2 TIMES DAILY
Qty: 180 TABLET | Refills: 3 | Status: SHIPPED | OUTPATIENT
Start: 2025-05-21

## 2025-05-21 RX ORDER — HYDROCHLOROTHIAZIDE 25 MG/1
25 TABLET ORAL DAILY
Qty: 90 TABLET | Refills: 3 | Status: SHIPPED | OUTPATIENT
Start: 2025-05-21

## 2025-05-21 ASSESSMENT — ENCOUNTER SYMPTOMS
COUGH: 0
DIAPHORESIS: 0
OCCASIONAL FEELINGS OF UNSTEADINESS: 0
WEIGHT LOSS: 0
DIZZINESS: 0
IRREGULAR HEARTBEAT: 0
SHORTNESS OF BREATH: 0
LOSS OF SENSATION IN FEET: 0
DEPRESSION: 0
PALPITATIONS: 0
PND: 0
FEVER: 0
SYNCOPE: 0
NEAR-SYNCOPE: 0
ORTHOPNEA: 0
CLAUDICATION: 0
WEAKNESS: 0
WHEEZING: 0
DYSPNEA ON EXERTION: 0
MYALGIAS: 0
WEIGHT GAIN: 0

## 2025-05-21 ASSESSMENT — LIFESTYLE VARIABLES
HOW OFTEN DO YOU HAVE A DRINK CONTAINING ALCOHOL: 2-3 TIMES A WEEK
HAS A RELATIVE, FRIEND, DOCTOR, OR ANOTHER HEALTH PROFESSIONAL EXPRESSED CONCERN ABOUT YOUR DRINKING OR SUGGESTED YOU CUT DOWN: NO
AUDIT TOTAL SCORE: 3
AUDIT-C TOTAL SCORE: 3
HOW OFTEN DO YOU HAVE SIX OR MORE DRINKS ON ONE OCCASION: NEVER
HOW MANY STANDARD DRINKS CONTAINING ALCOHOL DO YOU HAVE ON A TYPICAL DAY: 1 OR 2
HAVE YOU OR SOMEONE ELSE BEEN INJURED AS A RESULT OF YOUR DRINKING: NO
SKIP TO QUESTIONS 9-10: 1

## 2025-05-21 ASSESSMENT — PAIN SCALES - GENERAL: PAINLEVEL_OUTOF10: 0-NO PAIN

## 2025-05-21 NOTE — PROGRESS NOTES
Subjective      Chief Complaint   Patient presents with    Follow-up     Follow up,        64-year-old male with history of atrial flutter status post catheter-based therapy in 2019 and again in April 2024 presents for cardiac evaluation.  He is a patient of Dr. Cortez who manages his electrical issues. He was catheterized in May 2022 for VT to find mild non-obstructive disease. He has been doing well, no issues with chest pain or dyspnea.            Review of Systems   Constitutional: Negative for diaphoresis, fever, weight gain and weight loss.   Eyes:  Negative for visual disturbance.   Cardiovascular:  Negative for chest pain, claudication, dyspnea on exertion, irregular heartbeat, leg swelling, near-syncope, orthopnea, palpitations, paroxysmal nocturnal dyspnea and syncope.   Respiratory:  Negative for cough, shortness of breath and wheezing.    Musculoskeletal:  Negative for muscle weakness and myalgias.   Neurological:  Negative for dizziness and weakness.   All other systems reviewed and are negative.       Medical History[1]     Surgical History[2]     Social History     Socioeconomic History    Marital status:      Spouse name: Not on file    Number of children: Not on file    Years of education: Not on file    Highest education level: Not on file   Occupational History    Not on file   Tobacco Use    Smoking status: Never    Smokeless tobacco: Never   Vaping Use    Vaping status: Never Used   Substance and Sexual Activity    Alcohol use: Yes     Alcohol/week: 2.0 standard drinks of alcohol     Types: 2 Standard drinks or equivalent per week     Comment: Socially    Drug use: Never    Sexual activity: Yes     Partners: Female   Other Topics Concern    Not on file   Social History Narrative    Not on file     Social Drivers of Health     Financial Resource Strain: Low Risk  (4/4/2024)    Overall Financial Resource Strain (CARDIA)     Difficulty of Paying Living Expenses: Not hard at all   Food  Insecurity: No Food Insecurity (4/5/2024)    Hunger Vital Sign     Worried About Running Out of Food in the Last Year: Never true     Ran Out of Food in the Last Year: Never true   Transportation Needs: No Transportation Needs (4/4/2024)    PRAPARE - Transportation     Lack of Transportation (Medical): No     Lack of Transportation (Non-Medical): No   Physical Activity: Insufficiently Active (4/5/2024)    Exercise Vital Sign     Days of Exercise per Week: 2 days     Minutes of Exercise per Session: 60 min   Stress: No Stress Concern Present (4/5/2024)    [employer] [employer] - Occupational Stress Questionnaire     Feeling of Stress : Not at all   Social Connections: Moderately Integrated (4/5/2024)    Social Connection and Isolation Panel [NHANES]     Frequency of Communication with Friends and Family: More than three times a week     Frequency of Social Gatherings with Friends and Family: More than three times a week     Attends Tenriism Services: Never     Active Member of Clubs or Organizations: Yes     Attends Club or Organization Meetings: More than 4 times per year     Marital Status:    Intimate Partner Violence: Not At Risk (4/5/2024)    Humiliation, Afraid, Rape, and Kick questionnaire     Fear of Current or Ex-Partner: No     Emotionally Abused: No     Physically Abused: No     Sexually Abused: No   Housing Stability: Low Risk  (4/4/2024)    Housing Stability Vital Sign     Unable to Pay for Housing in the Last Year: No     Number of Places Lived in the Last Year: 1     Unstable Housing in the Last Year: No        Family History[3]     OBJECTIVE:    Vitals:    05/21/25 1501   BP: 135/80   Pulse: 62   Resp: 16   SpO2: 97%        Vitals reviewed.   Constitutional:       Appearance: Normal and healthy appearance. Not in distress.   Pulmonary:      Effort: Pulmonary effort is normal.      Breath sounds: Normal breath sounds.   Cardiovascular:      Normal rate. Regular rhythm. Normal  S1. Normal S2.       Murmurs: There is no murmur.      No gallop.  No click.   Pulses:     Intact distal pulses.   Edema:     Peripheral edema absent.   Skin:     General: Skin is warm and dry.   Neurological:      General: No focal deficit present.          Lab Review:   Lab Results   Component Value Date     (L) 09/26/2024    K 4.1 09/26/2024     09/26/2024    CO2 27 09/26/2024    BUN 15 09/26/2024    CREATININE 0.87 09/26/2024    GLUCOSE 100 (H) 09/26/2024    CALCIUM 9.0 09/26/2024     Lab Results   Component Value Date    CHOL 133 03/04/2024    TRIG 89 03/04/2024    HDL 46.0 03/04/2024       Lab Results   Component Value Date    LDLCALC 69 03/04/2024        Atherosclerotic heart disease of native coronary artery without angina pectoris  Stable without angina. Very mild disease on angio 2022. His LDL has historically been low without therapy. Gets checked with his PCP soon. NO need for asa while on AC.    Typical atrial flutter (Multi)  NSR currently without symptoms. Continue metoprolol and Eliquis    Benign essential hypertension  Controlled. Continue BID lisinopril. Lifestyle modifications were discussed. I advocated for mediterranean and plant based eating. We also discussed exercise. 150 minutes a week of moderate intensity exercise is recommended per our ACC/AHA guidelines              [1]   Past Medical History:  Diagnosis Date    Atrial fibrillation (Multi)     Hypertension     Personal history of other diseases of the circulatory system     History of hypertension    Shortness of breath 03/04/2024    Stiffness of left knee 03/01/2022   [2]   Past Surgical History:  Procedure Laterality Date    CARDIAC CATHETERIZATION      CARDIAC ELECTROPHYSIOLOGY PROCEDURE N/A 4/8/2024    Procedure: Ablation Atrial Flutter;  Surgeon: Maximilian Cortez MD;  Location: [facility];  Service: Electrophysiology;  Laterality: N/A;  no pre auth required    OTHER SURGICAL HISTORY  02/16/2021    Knee  replacement    OTHER SURGICAL HISTORY  02/16/2021    Heart surgery   [3] No family history on file.

## 2025-05-21 NOTE — ASSESSMENT & PLAN NOTE
Controlled. Continue BID lisinopril. Lifestyle modifications were discussed. I advocated for mediterranean and plant based eating. We also discussed exercise. 150 minutes a week of moderate intensity exercise is recommended per our ACC/AHA guidelines

## 2025-05-21 NOTE — ASSESSMENT & PLAN NOTE
Stable without angina. Very mild disease on angio 2022. His LDL has historically been low without therapy. Gets checked with his PCP soon. NO need for asa while on AC.

## 2025-08-11 DIAGNOSIS — I48.0 AF (PAROXYSMAL ATRIAL FIBRILLATION) (MULTI): ICD-10-CM

## 2025-08-11 RX ORDER — APIXABAN 5 MG/1
5 TABLET, FILM COATED ORAL 2 TIMES DAILY
Qty: 180 TABLET | Refills: 3 | Status: SHIPPED | OUTPATIENT
Start: 2025-08-11

## (undated) DEVICE — CLOSURE SYSTEM, VASCULAR, MVP 6-12FR, VENOUS

## (undated) DEVICE — PAD, ELECTRODE DEFIB PADPRO ADULT STRL W/ADAPTER

## (undated) DEVICE — CABLE, OCTAPOLAR, EASYMATE 8 125CML

## (undated) DEVICE — ELECTRODE KIT, ENSITE X EP SYSTEM SURFACE

## (undated) DEVICE — CATHETER, FLEXABILITY, BI-DIRECTIONAL, 8F X 115CM, F-J CURVE, 4MM TIP

## (undated) DEVICE — CABLE, LIVEWIRE/ RESPONSE 20 PIN ENSITE X

## (undated) DEVICE — CABLE, ABLATION, IBI 1641, 250CM

## (undated) DEVICE — INTRODUCER, SHEATH, FAST-CATH, 7 FR X 23 CM

## (undated) DEVICE — INTRODUCER, PERCUTANEOUS, SHEATH AVANTI PLUS, W/MINI GUIDEWIRE, STANDARD LENGTH, 8 FR, 11 CM

## (undated) DEVICE — COVER, EQUIPMENT, ZERO GRAVITY

## (undated) DEVICE — CATHETER, ELCTROPHYSIOLOGY, STEERABLE, DUO DEC, 20 ELECTRODE, 2-5-2 MM SPACING, SUPER LARGE CURL, 7 FR X 95 CM

## (undated) DEVICE — CATHETER, ELECTRODE, STEERABLE, EP-XT, LARGE CURVE, 6 FR X 110 CM

## (undated) DEVICE — PACK, ANGIO P2, CUSTOM, LAKE

## (undated) DEVICE — TUBING SET, COOL POINT, 2.6M, INDIVIDUAL